# Patient Record
Sex: FEMALE | Race: WHITE | HISPANIC OR LATINO | Employment: UNEMPLOYED | URBAN - METROPOLITAN AREA
[De-identification: names, ages, dates, MRNs, and addresses within clinical notes are randomized per-mention and may not be internally consistent; named-entity substitution may affect disease eponyms.]

---

## 2017-01-31 ENCOUNTER — ALLSCRIPTS OFFICE VISIT (OUTPATIENT)
Dept: OTHER | Facility: OTHER | Age: 32
End: 2017-01-31

## 2017-01-31 DIAGNOSIS — J20.9 ACUTE BRONCHITIS: ICD-10-CM

## 2017-01-31 DIAGNOSIS — E55.9 VITAMIN D DEFICIENCY: ICD-10-CM

## 2017-01-31 DIAGNOSIS — Z72.51 HIGH RISK HETEROSEXUAL BEHAVIOR: ICD-10-CM

## 2017-01-31 DIAGNOSIS — F32.9 MAJOR DEPRESSIVE DISORDER, SINGLE EPISODE: ICD-10-CM

## 2017-01-31 DIAGNOSIS — M54.2 CERVICALGIA: ICD-10-CM

## 2017-01-31 DIAGNOSIS — R53.83 OTHER FATIGUE: ICD-10-CM

## 2017-02-03 ENCOUNTER — ALLSCRIPTS OFFICE VISIT (OUTPATIENT)
Dept: OTHER | Facility: OTHER | Age: 32
End: 2017-02-03

## 2017-02-03 ENCOUNTER — APPOINTMENT (OUTPATIENT)
Dept: LAB | Facility: CLINIC | Age: 32
End: 2017-02-03

## 2017-02-03 ENCOUNTER — TRANSCRIBE ORDERS (OUTPATIENT)
Dept: LAB | Facility: CLINIC | Age: 32
End: 2017-02-03

## 2017-02-03 DIAGNOSIS — R53.83 OTHER FATIGUE: ICD-10-CM

## 2017-02-03 DIAGNOSIS — E55.9 VITAMIN D DEFICIENCY: ICD-10-CM

## 2017-02-03 DIAGNOSIS — Z72.51 HIGH RISK HETEROSEXUAL BEHAVIOR: ICD-10-CM

## 2017-02-03 DIAGNOSIS — F32.9 MAJOR DEPRESSIVE DISORDER, SINGLE EPISODE: ICD-10-CM

## 2017-02-03 LAB
25(OH)D3 SERPL-MCNC: 18.3 NG/ML (ref 30–100)
ALBUMIN SERPL BCP-MCNC: 3.5 G/DL (ref 3.5–5)
ALP SERPL-CCNC: 79 U/L (ref 46–116)
ALT SERPL W P-5'-P-CCNC: 17 U/L (ref 12–78)
ANION GAP SERPL CALCULATED.3IONS-SCNC: 9 MMOL/L (ref 4–13)
AST SERPL W P-5'-P-CCNC: 14 U/L (ref 5–45)
BASOPHILS # BLD AUTO: 0.03 THOUSANDS/ΜL (ref 0–0.1)
BASOPHILS NFR BLD AUTO: 0 % (ref 0–1)
BILIRUB SERPL-MCNC: 0.31 MG/DL (ref 0.2–1)
BUN SERPL-MCNC: 8 MG/DL (ref 5–25)
CALCIUM SERPL-MCNC: 8.7 MG/DL (ref 8.3–10.1)
CHLORIDE SERPL-SCNC: 106 MMOL/L (ref 100–108)
CHOLEST SERPL-MCNC: 115 MG/DL (ref 50–200)
CO2 SERPL-SCNC: 27 MMOL/L (ref 21–32)
CREAT SERPL-MCNC: 0.74 MG/DL (ref 0.6–1.3)
EOSINOPHIL # BLD AUTO: 0.27 THOUSAND/ΜL (ref 0–0.61)
EOSINOPHIL NFR BLD AUTO: 3 % (ref 0–6)
ERYTHROCYTE [DISTWIDTH] IN BLOOD BY AUTOMATED COUNT: 12.7 % (ref 11.6–15.1)
GFR SERPL CREATININE-BSD FRML MDRD: >60 ML/MIN/1.73SQ M
GLUCOSE SERPL-MCNC: 73 MG/DL (ref 65–140)
HCT VFR BLD AUTO: 39.1 % (ref 34.8–46.1)
HDLC SERPL-MCNC: 37 MG/DL (ref 40–60)
HGB BLD-MCNC: 13.2 G/DL (ref 11.5–15.4)
LDLC SERPL CALC-MCNC: 67 MG/DL (ref 0–100)
LYMPHOCYTES # BLD AUTO: 1.48 THOUSANDS/ΜL (ref 0.6–4.47)
LYMPHOCYTES NFR BLD AUTO: 17 % (ref 14–44)
MCH RBC QN AUTO: 31 PG (ref 26.8–34.3)
MCHC RBC AUTO-ENTMCNC: 33.8 G/DL (ref 31.4–37.4)
MCV RBC AUTO: 92 FL (ref 82–98)
MONOCYTES # BLD AUTO: 0.75 THOUSAND/ΜL (ref 0.17–1.22)
MONOCYTES NFR BLD AUTO: 9 % (ref 4–12)
NEUTROPHILS # BLD AUTO: 6.03 THOUSANDS/ΜL (ref 1.85–7.62)
NEUTS SEG NFR BLD AUTO: 71 % (ref 43–75)
NRBC BLD AUTO-RTO: 0 /100 WBCS
PLATELET # BLD AUTO: 164 THOUSANDS/UL (ref 149–390)
PMV BLD AUTO: 13.7 FL (ref 8.9–12.7)
POTASSIUM SERPL-SCNC: 3.6 MMOL/L (ref 3.5–5.3)
PROT SERPL-MCNC: 7.2 G/DL (ref 6.4–8.2)
RBC # BLD AUTO: 4.26 MILLION/UL (ref 3.81–5.12)
SODIUM SERPL-SCNC: 142 MMOL/L (ref 136–145)
TRIGL SERPL-MCNC: 57 MG/DL
TSH SERPL DL<=0.05 MIU/L-ACNC: 1.02 UIU/ML (ref 0.36–3.74)
WBC # BLD AUTO: 8.59 THOUSAND/UL (ref 4.31–10.16)

## 2017-02-03 PROCEDURE — 87340 HEPATITIS B SURFACE AG IA: CPT

## 2017-02-03 PROCEDURE — 80061 LIPID PANEL: CPT

## 2017-02-03 PROCEDURE — 80053 COMPREHEN METABOLIC PANEL: CPT

## 2017-02-03 PROCEDURE — 84443 ASSAY THYROID STIM HORMONE: CPT

## 2017-02-03 PROCEDURE — 87389 HIV-1 AG W/HIV-1&-2 AB AG IA: CPT

## 2017-02-03 PROCEDURE — 86592 SYPHILIS TEST NON-TREP QUAL: CPT

## 2017-02-03 PROCEDURE — 36415 COLL VENOUS BLD VENIPUNCTURE: CPT

## 2017-02-03 PROCEDURE — 82306 VITAMIN D 25 HYDROXY: CPT

## 2017-02-03 PROCEDURE — 85025 COMPLETE CBC W/AUTO DIFF WBC: CPT

## 2017-02-03 PROCEDURE — 86803 HEPATITIS C AB TEST: CPT

## 2017-02-05 LAB
HBV SURFACE AG SER QL: NORMAL
HCV AB SER QL: NORMAL

## 2017-02-06 ENCOUNTER — GENERIC CONVERSION - ENCOUNTER (OUTPATIENT)
Dept: OTHER | Facility: OTHER | Age: 32
End: 2017-02-06

## 2017-02-06 LAB
HIV 1+2 AB+HIV1 P24 AG SERPL QL IA: NORMAL
RPR SER QL: NORMAL

## 2018-01-11 NOTE — MISCELLANEOUS
Reason For Visit  Reason For Visit Free Text Note Form: Contraception insertion  Patient desires Mirena; application made to and approved by the [employer]  Mirena received at facility 7/26/16 and patient scheduled for insertion 8/1/16  Active Problems    1  Acne (706 1) (L70 9)   2  Acute pharyngitis (462) (J02 9)   3  Acute urinary tract infection (599 0) (N39 0)   4  Cervicalgia (723 1) (M54 2)   5  Contact dermatitis (692 9) (L25 9)   6  Depression (311) (F32 9)   7  Dysuria (788 1) (R30 0)   8  Edema (782 3) (R60 9)   9  Encounter for contraceptive management (V25 9) (Z30 9)   10  Encounter for other general counseling or advice on contraception (V25 09) (Z30 09)   11  Intrauterine contraceptive device, checking (V25 42) (Z30 431)   12  Neck pain (723 1) (M54 2)   13  Routine Gynecological Exam With Cervical Pap Smear (V72 31)   14  Vitamin D deficiency (268 9) (E55 9)    Current Meds   1  No Reported Medications Recorded    Allergies    1   No Known Drug Allergies    Signatures   Electronically signed by : KALLI Roman; Jul 27 2016 10:49AM EST                       (Author)

## 2018-01-13 VITALS
HEART RATE: 80 BPM | SYSTOLIC BLOOD PRESSURE: 120 MMHG | TEMPERATURE: 98.8 F | WEIGHT: 146 LBS | OXYGEN SATURATION: 98 % | DIASTOLIC BLOOD PRESSURE: 80 MMHG | RESPIRATION RATE: 18 BRPM | BODY MASS INDEX: 24.32 KG/M2 | HEIGHT: 65 IN

## 2018-01-13 NOTE — MISCELLANEOUS
Provider Comments  Provider Comments:   Patient is a no-show for her 1140 acute appointment today        Signatures   Electronically signed by : Asa Gowers, PAC; Jan 31 2017 11:42AM EST                       (Author)

## 2018-01-14 VITALS
BODY MASS INDEX: 24.32 KG/M2 | SYSTOLIC BLOOD PRESSURE: 110 MMHG | DIASTOLIC BLOOD PRESSURE: 70 MMHG | WEIGHT: 146 LBS | RESPIRATION RATE: 18 BRPM | HEART RATE: 78 BPM | HEIGHT: 65 IN | TEMPERATURE: 97.5 F | OXYGEN SATURATION: 100 %

## 2018-01-15 NOTE — RESULT NOTES
Verified Results  (1) CBC/PLT/DIFF [id number] 11:13AM Patience Sauce Order Number: [id number]     Test Name Result Flag Reference   WBC COUNT 8 59 Thousand/uL  4 31-10 16   RBC COUNT 4 26 Million/uL  3 81-5 12   HEMOGLOBIN 13 2 g/dL  11 5-15 4   HEMATOCRIT 39 1 %  34 8-46  1   MCV 92 fL  82-98   MCH 31 0 pg  26 8-34 3   MCHC 33 8 g/dL  31 4-37 4   RDW 12 7 %  11 6-15 1   MPV 13 7 fL H 8 9-12 7   PLATELET COUNT 783 Thousands/uL  149-390   nRBC AUTOMATED 0 /100 WBCs     NEUTROPHILS RELATIVE PERCENT 71 %  43-75   LYMPHOCYTES RELATIVE PERCENT 17 %  14-44   MONOCYTES RELATIVE PERCENT 9 %  4-12   EOSINOPHILS RELATIVE PERCENT 3 %  0-6   BASOPHILS RELATIVE PERCENT 0 %  0-1   NEUTROPHILS ABSOLUTE COUNT 6 03 Thousands/?L  1 85-7 62   LYMPHOCYTES ABSOLUTE COUNT 1 48 Thousands/?L  0 60-4 47   MONOCYTES ABSOLUTE COUNT 0 75 Thousand/?L  0 17-1 22   EOSINOPHILS ABSOLUTE COUNT 0 27 Thousand/?L  0 00-0 61   BASOPHILS ABSOLUTE COUNT 0 03 Thousands/?L  0 00-0 10   - Patient Instructions: This bloodwork is non-fasting  Please drink two glasses of water morning of bloodwork  - Patient Instructions: This bloodwork is non-fasting  Please drink two glasses of water morning of bloodwork  (1) COMPREHENSIVE METABOLIC PANEL [id number] 57:22CC Patience Sauce Order Number: [id number]     Test Name Result Flag Reference   GLUCOSE,RANDM 73 mg/dL     If the patient is fasting, the ADA then defines impaired fasting glucose as > 100 mg/dL and diabetes as > or equal to 123 mg/dL     SODIUM 142 mmol/L  136-145   POTASSIUM 3 6 mmol/L  3 5-5 3   CHLORIDE 106 mmol/L  100-108   CARBON DIOXIDE 27 mmol/L  21-32   ANION GAP (CALC) 9 mmol/L  4-13   BLOOD UREA NITROGEN 8 mg/dL  5-25   CREATININE 0 74 mg/dL  0 60-1 30   Standardized to IDMS reference method   CALCIUM 8 7 mg/dL  8 3-10 1   BILI, TOTAL 0 31 mg/dL  0 20-1 00   ALK PHOSPHATAS 79 U/L     ALT (SGPT) 17 U/L  12-78   AST(SGOT) 14 U/L  5-45   ALBUMIN 3 5 g/dL 3 5-5 0   TOTAL PROTEIN 7 2 g/dL  6 4-8 2   eGFR Non-African American      >60 0 ml/min/1 73sq m   - Patient Instructions: This is a fasting blood test  Water,black tea or black  coffee only after 9:00pm the night before test Drink 2 glasses of water the morning of test   National Kidney Disease Education Program recommendations are as follows:  GFR calculation is accurate only with a steady state creatinine  Chronic Kidney disease less than 60 ml/min/1 73 sq  meters  Kidney failure less than 15 ml/min/1 73 sq  meters  (1) HEP C ANTIBODY 03Feb2017 11:13AM Shade Cyphers Order Number: [id number]     Test Name Result Flag Reference   HEPATITIS C ANTIBODY Non-reactive  Non-reactive     (1) HEP B SURFACE ANTIGEN 03Feb2017 11:13AM Shade Cyphers Order Number: [id number]     Test Name Result Flag Reference   HEPATITIS B SURFACE ANTIGEN Non-reactive  Non-reactive, NonReactive - Confirmed     (1) LIPID PANEL, FASTING 03Feb2017 11:13AM Shade Cyphers Order Number: [id number]     Test Name Result Flag Reference   CHOLESTEROL 115 mg/dL     HDL,DIRECT 37 mg/dL L 40-60   Specimen collection should occur prior to Metamizole administration due to the potential for falsely depressed results  LDL CHOLESTEROL CALCULATED 67 mg/dL  0-100   - Patient Instructions: This is a fasting blood test  Water,black tea or black  coffee only after 9:00pm the night before test   Drink 2 glasses of water the morning of test     - Patient Instructions:  This is a fasting blood test  Water,black tea or black  coffee only after 9:00pm the night before test Drink 2 glasses of water the morning of test   Triglyceride:         Normal              <150 mg/dl       Borderline High    150-199 mg/dl       High               200-499 mg/dl       Very High          >499 mg/dl  Cholesterol:         Desirable        <200 mg/dl      Borderline High  200-239 mg/dl      High             >239 mg/dl  HDL Cholesterol:        High    >59 mg/dL      Low     <41 mg/dL  LDL CALCULATED:    This screening LDL is a calculated result  It does not have the accuracy of the Direct Measured LDL in the monitoring of patients with hyperlipidemia and/or statin therapy  Direct Measure LDL (EPB586) must be ordered separately in these patients  TRIGLYCERIDES 57 mg/dL  <=150   Specimen collection should occur prior to N-Acetylcysteine or Metamizole administration due to the potential for falsely depressed results  (1) RPR [id number] 11:13AM Martir Amato Order Number: [id number]     Test Name Result Flag Reference   RPR Non-Reactive  Non-Reactive     (1) HIV AG/AB [place] GEN [id number] 11:13AM Martir Amato Order Number: [id number]     Test Name Result Flag Reference   HIV 1/2 AND P24 Non-Reactive  Non-Reactive   This test detects HIV 1, HIV2 and p24 Antigen  (1) TSH WITH FT4 REFLEX [id number] 11:13AM Matrir Amato Order Number: [id number]     Test Name Result Flag Reference   TSH 1 020 uIU/mL  0 358-3 740   - Patient Instructions: This is a fasting blood test  Water,black tea or black  coffee only after 9:00pm the night before test Drink 2 glasses of water the morning of test   Patients undergoing fluorescein dye angiography may retain small amounts of fluorescein in the body for 48-72 hours post procedure  Samples containing fluorescein can produce falsely depressed TSH values  If the patient had this procedure,a specimen should be resubmitted post fluorescein clearance            The recommended reference ranges for TSH during pregnancy are as follows:  First trimester 0 1 to 2 5 uIU/mL  Second trimester  0 2 to 3 0 uIU/mL  Third trimester 0 3 to 3 0 uIU/m     (1) VITAMIN D 25-HYDROXY [id number] 11:13AM Wendy Lynch Order Number: [id number]     Test Name Result Flag Reference   VIT D 25-HYDROX 18 3 ng/mL L 30 0-100 0   This assay is a certified procedure of the CDC Vitamin D Standardization Certification Program (VDSCP)     Deficiency <20ng/ml   Insufficiency 20-30ng/ml   Sufficient  ng/ml     *Patients undergoing fluorescein dye angiography may retain small amounts of fluorescein in the body for 48-72 hours post procedure  Samples containing fluorescein can produce falsely elevated Vitamin D values  If the patient had this procedure, a specimen should be resubmitted post fluorescein clearance         Plan  Vitamin D deficiency    · Vitamin D3 5000 UNIT Oral Capsule; TAKE 1 CAPSULE BY MOUTH EVERY DAY

## 2018-01-18 NOTE — MISCELLANEOUS
Provider Comments  Provider Comments:   Patient is a no-show for her 8:00 appointment today        Signatures   Electronically signed by : Jarrod Castano PAC; Jun 27 2016  8:14AM EST                       (Author)    Electronically signed by : TARSHA Ugalde ; Jul 11 2016 11:08AM EST

## 2019-06-01 ENCOUNTER — HOSPITAL ENCOUNTER (EMERGENCY)
Facility: HOSPITAL | Age: 34
Discharge: HOME | End: 2019-06-01
Attending: EMERGENCY MEDICINE

## 2019-06-01 VITALS
TEMPERATURE: 97.9 F | DIASTOLIC BLOOD PRESSURE: 78 MMHG | HEIGHT: 65 IN | BODY MASS INDEX: 26.96 KG/M2 | SYSTOLIC BLOOD PRESSURE: 126 MMHG | WEIGHT: 161.82 LBS | HEART RATE: 63 BPM | OXYGEN SATURATION: 100 % | RESPIRATION RATE: 16 BRPM

## 2019-06-01 DIAGNOSIS — K02.9 DENTAL CARIES: ICD-10-CM

## 2019-06-01 DIAGNOSIS — K04.7 DENTAL INFECTION: Primary | ICD-10-CM

## 2019-06-01 PROCEDURE — 99284 EMERGENCY DEPT VISIT MOD MDM: CPT | Performed by: EMERGENCY MEDICINE

## 2019-06-01 PROCEDURE — 99283 EMERGENCY DEPT VISIT LOW MDM: CPT

## 2019-06-01 RX ORDER — NAPROXEN 500 MG/1
500 TABLET ORAL 2 TIMES DAILY PRN
Qty: 20 TABLET | Refills: 0 | Status: SHIPPED | OUTPATIENT
Start: 2019-06-01 | End: 2019-09-06

## 2019-06-01 RX ORDER — NAPROXEN 250 MG/1
500 TABLET ORAL ONCE
Status: COMPLETED | OUTPATIENT
Start: 2019-06-01 | End: 2019-06-01

## 2019-06-01 RX ORDER — PENICILLIN V POTASSIUM 500 MG/1
500 TABLET ORAL EVERY 8 HOURS SCHEDULED
Qty: 30 TABLET | Refills: 0 | Status: SHIPPED | OUTPATIENT
Start: 2019-06-01 | End: 2019-06-11

## 2019-06-01 RX ORDER — HYDROCODONE BITARTRATE AND ACETAMINOPHEN 5; 325 MG/1; MG/1
1 TABLET ORAL ONCE
Status: COMPLETED | OUTPATIENT
Start: 2019-06-01 | End: 2019-06-01

## 2019-06-01 RX ORDER — PENICILLIN V POTASSIUM 250 MG/1
500 TABLET ORAL ONCE
Status: COMPLETED | OUTPATIENT
Start: 2019-06-01 | End: 2019-06-01

## 2019-06-01 RX ADMIN — PENICILLIN V POTASIUM 500 MG: 250 TABLET ORAL at 23:49

## 2019-06-01 RX ADMIN — HYDROCODONE BITARTRATE AND ACETAMINOPHEN 1 TABLET: 5; 325 TABLET ORAL at 23:49

## 2019-06-01 RX ADMIN — NAPROXEN 500 MG: 250 TABLET ORAL at 23:48

## 2019-07-09 ENCOUNTER — ANNUAL EXAM (OUTPATIENT)
Dept: OBGYN CLINIC | Facility: CLINIC | Age: 34
End: 2019-07-09

## 2019-07-09 VITALS
HEIGHT: 65 IN | BODY MASS INDEX: 26.33 KG/M2 | WEIGHT: 158 LBS | DIASTOLIC BLOOD PRESSURE: 76 MMHG | SYSTOLIC BLOOD PRESSURE: 122 MMHG

## 2019-07-09 DIAGNOSIS — Z01.419 WOMEN'S ANNUAL ROUTINE GYNECOLOGICAL EXAMINATION: Primary | ICD-10-CM

## 2019-07-09 PROBLEM — R87.810 CERVICAL HIGH RISK HPV (HUMAN PAPILLOMAVIRUS) TEST POSITIVE: Status: ACTIVE | Noted: 2017-07-06

## 2019-07-09 PROCEDURE — G0145 SCR C/V CYTO,THINLAYER,RESCR: HCPCS | Performed by: NURSE PRACTITIONER

## 2019-07-09 PROCEDURE — 87624 HPV HI-RISK TYP POOLED RSLT: CPT | Performed by: NURSE PRACTITIONER

## 2019-07-09 PROCEDURE — 99385 PREV VISIT NEW AGE 18-39: CPT | Performed by: NURSE PRACTITIONER

## 2019-07-09 NOTE — PROGRESS NOTES
Subjective  HPI:     Jennifer Zacarias is a 36 y o  female  She is a [place] 2 Para 2, both vaginal births, she did have pre-eclampsia with her daughters birth  Her menstrual cycles are regular occurring every month that lasts for 2 days and are very light  History of using [address] IUD but has been removed last year due to cramping and pain  She is not currently using any method of contraception  She denies issues with intimacy  She denies /GI and Gyn complaints  She denies depression/anxiety  Medical, surgical and family history reviewed  Her dental care is up-to-date  She eats a healthy diet and exercises regularly  She is happy with her weight  Gynecologic History    Patient's last menstrual period was 2019  Last Pap: 2018  Results were: Negative intraepithelial cells with positive other HPV    Obstetric History    OB History    Para Term  AB Living   2 2 2     2   SAB TAB Ectopic Multiple Live Births                  # Outcome Date GA Lbr Supa/2nd Weight Sex Delivery Anes PTL Lv   2 Term            1 Term                The following portions of the patient's history were reviewed and updated as appropriate: allergies, current medications, past family history, past medical history, past social history, past surgical history and problem list     Review of Systems    Pertinent items are noted in HPI  Objective    Physical Exam   Constitutional: Vital signs are normal  She appears well-developed and well-nourished  Genitourinary: Uterus normal  Pelvic exam was performed with patient supine  There is no rash, tenderness, lesion or Bartholin's cyst on the right labia  There is no rash, tenderness, lesion or Bartholin's cyst on the left labia  Right adnexum does not display mass, does not display tenderness and does not display fullness  Left adnexum does not display mass, does not display tenderness and does not display fullness  Cervix is not parous  Cervix exhibits friability  Cervix does not exhibit motion tenderness, lesion, discharge, polyp or nabothian cyst      Uterus is anteverted  Genitourinary Comments: Normal physiological discharge noted  HENT:   Head: Normocephalic and atraumatic  Neck: Neck supple  No thyromegaly present  Cardiovascular: Normal rate, regular rhythm, S1 normal, S2 normal and normal heart sounds  Pulmonary/Chest: Effort normal and breath sounds normal  Right breast exhibits no inverted nipple, no mass, no nipple discharge, no skin change and no tenderness  Left breast exhibits no inverted nipple, no mass, no nipple discharge, no skin change and no tenderness  Abdominal: Soft  Bowel sounds are normal  She exhibits no distension and no mass  There is no tenderness  There is no guarding  Lymphadenopathy:     She has no cervical adenopathy  She has no axillary adenopathy  Right: No inguinal adenopathy present  Left: No inguinal adenopathy present  Neurological: She is alert  Skin: Skin is warm  Psychiatric: She has a normal mood and affect  Nursing note and vitals reviewed  Assessment and Plan    Maynor Mendez was seen today for gynecologic exam     Diagnoses and all orders for this visit:    Women's annual routine gynecological examination  -     Liquid-based pap, screening      Patient informed of a Stable GYN exam  A pap smear was performed  The current ASCCP guidelines were reviewed  The low risk patient will receive pap smear screening every 3 years until the age of 34 and then every 3 to 5 years with HPV co-testing from the ages of 33-67  I emphasized the importance of an annual pelvic and breast exam      A yearly mammogram is recommended for breast cancer screening starting at age 36  All questions have been answered to her satisfaction  Follow up in: 1 year

## 2019-07-10 LAB
HPV HR 12 DNA CVX QL NAA+PROBE: NEGATIVE
HPV16 DNA CVX QL NAA+PROBE: NEGATIVE
HPV18 DNA CVX QL NAA+PROBE: NEGATIVE

## 2019-07-12 LAB
LAB AP GYN PRIMARY INTERPRETATION: NORMAL
LAB AP LMP: NORMAL
Lab: NORMAL

## 2019-07-29 ENCOUNTER — OFFICE VISIT (OUTPATIENT)
Dept: FAMILY MEDICINE CLINIC | Facility: CLINIC | Age: 34
End: 2019-07-29

## 2019-07-29 ENCOUNTER — TELEPHONE (OUTPATIENT)
Dept: FAMILY MEDICINE CLINIC | Facility: CLINIC | Age: 34
End: 2019-07-29

## 2019-07-29 VITALS
TEMPERATURE: 97.7 F | BODY MASS INDEX: 26.89 KG/M2 | WEIGHT: 161.6 LBS | SYSTOLIC BLOOD PRESSURE: 118 MMHG | OXYGEN SATURATION: 98 % | RESPIRATION RATE: 16 BRPM | HEART RATE: 78 BPM | DIASTOLIC BLOOD PRESSURE: 76 MMHG

## 2019-07-29 DIAGNOSIS — J01.00 ACUTE NON-RECURRENT MAXILLARY SINUSITIS: Primary | ICD-10-CM

## 2019-07-29 PROCEDURE — 99213 OFFICE O/P EST LOW 20 MIN: CPT | Performed by: FAMILY MEDICINE

## 2019-07-29 RX ORDER — AMOXICILLIN 500 MG/1
500 CAPSULE ORAL EVERY 12 HOURS SCHEDULED
Qty: 20 CAPSULE | Refills: 0 | Status: SHIPPED | OUTPATIENT
Start: 2019-07-29 | End: 2019-08-08

## 2019-07-29 NOTE — PROGRESS NOTES
Assessment/Plan:    Acute non-recurrent maxillary sinusitis  Patient has been having symptoms starting from Friday which include sore throat headache sinus pressure which is progressively getting worse  Patient states that she also spiked a subjective fever  Patient has no known drug allergies  -will start amoxicillin 10 days bid       Diagnoses and all orders for this visit:    Acute non-recurrent maxillary sinusitis  -     amoxicillin (AMOXIL) 500 mg capsule; Take 1 capsule (500 mg total) by mouth every 12 (twelve) hours for 10 days          Subjective:      Patient ID: Judge Warner is a 36 y o  female  This is a very pleasant 30-year-old female who presents to the clinic for sick visit  Patient has been having fever sore throat sinus pressure starting from this past Friday  Patient states that the symptoms is getting progressively worse  Patient failed over-the-counter and supportive treatment  She states that she has a subjective fever  The following portions of the patient's history were reviewed and updated as appropriate: allergies, current medications, past family history, past medical history, past social history, past surgical history and problem list     Review of Systems   Constitutional: Negative for chills and fever  HENT: Positive for sinus pressure, sinus pain and sore throat  Negative for congestion  Eyes: Negative for visual disturbance  Respiratory: Negative for wheezing  Cardiovascular: Negative for chest pain  Gastrointestinal: Negative for abdominal pain, blood in stool and nausea  Endocrine: Negative for cold intolerance and heat intolerance  Genitourinary: Negative for dysuria and hematuria  Musculoskeletal: Negative for gait problem  Skin: Negative for rash  Allergic/Immunologic: Negative for environmental allergies  Neurological: Negative for syncope  Hematological: Does not bruise/bleed easily     Psychiatric/Behavioral: Negative for behavioral problems  Objective:      /76 (BP Location: Right arm, Patient Position: Sitting, Cuff Size: Standard)   Pulse 78   Temp 97 7 °F (36 5 °C) (Temporal)   Resp 16   Wt 73 3 kg (161 lb 9 6 oz)   SpO2 98%   BMI 26 89 kg/m²          Physical Exam   Constitutional: She is oriented to person, place, and time  She appears well-developed and well-nourished  No distress  HENT:   Head: Normocephalic and atraumatic  Right Ear: External ear normal    Left Ear: External ear normal    Nose: Nose normal    Mouth/Throat: Oropharyngeal exudate present  Oropharyngeal area has erythema and exudates  Tender sinuses on the maxillary area  Eyes: Pupils are equal, round, and reactive to light  Conjunctivae and EOM are normal  Right eye exhibits no discharge  Left eye exhibits no discharge  Neck: Normal range of motion  Neck supple  No JVD present  No tracheal deviation present  No thyromegaly present  Cardiovascular: Normal rate, regular rhythm, normal heart sounds and intact distal pulses  Exam reveals no gallop and no friction rub  No murmur heard  Pulmonary/Chest: Effort normal and breath sounds normal  No respiratory distress  She has no wheezes  She exhibits no tenderness  Abdominal: Soft  Bowel sounds are normal  She exhibits no distension  There is no tenderness  There is no rebound  Musculoskeletal: Normal range of motion  She exhibits no edema or tenderness  Neurological: She is alert and oriented to person, place, and time  She has normal reflexes  Coordination normal    Skin: Skin is warm and dry  No rash noted  She is not diaphoretic  No erythema  Psychiatric: She has a normal mood and affect  Her behavior is normal  Thought content normal    Vitals reviewed

## 2019-07-29 NOTE — TELEPHONE ENCOUNTER
Unable to leave message to inform pt of office temperature   If pt returns phone call please ask if she would like to keep appt or reschedule

## 2019-07-29 NOTE — ASSESSMENT & PLAN NOTE
Patient has been having symptoms starting from Friday which include sore throat headache sinus pressure which is progressively getting worse  Patient states that she also spiked a subjective fever  Patient has no known drug allergies    -will start amoxicillin 10 days bid

## 2019-09-06 ENCOUNTER — OFFICE VISIT (OUTPATIENT)
Dept: FAMILY MEDICINE CLINIC | Facility: CLINIC | Age: 34
End: 2019-09-06

## 2019-09-06 VITALS
OXYGEN SATURATION: 97 % | DIASTOLIC BLOOD PRESSURE: 70 MMHG | TEMPERATURE: 97.7 F | HEART RATE: 72 BPM | BODY MASS INDEX: 27.16 KG/M2 | WEIGHT: 163 LBS | HEIGHT: 65 IN | RESPIRATION RATE: 18 BRPM | SYSTOLIC BLOOD PRESSURE: 122 MMHG

## 2019-09-06 DIAGNOSIS — N64.4 BREAST PAIN IN FEMALE: Primary | ICD-10-CM

## 2019-09-06 LAB — SL AMB POCT URINE HCG: NEGATIVE

## 2019-09-06 PROCEDURE — 99213 OFFICE O/P EST LOW 20 MIN: CPT | Performed by: FAMILY MEDICINE

## 2019-09-06 PROCEDURE — 81025 URINE PREGNANCY TEST: CPT | Performed by: FAMILY MEDICINE

## 2019-09-06 RX ORDER — IBUPROFEN 600 MG/1
600 TABLET ORAL EVERY 8 HOURS PRN
Qty: 30 TABLET | Refills: 0 | Status: SHIPPED | OUTPATIENT
Start: 2019-09-06 | End: 2020-12-22

## 2019-09-06 NOTE — PROGRESS NOTES
Assessment/Plan:    Breast pain in female  Patient has been experiencing painful breast right worse than left for the past 2 months  Initially started during her menstrual cycle however persisted  Pain is consistent every single day feels like a burning and swelling  Patient has 2 children, currently not breast-feeding  Currently sexually active no contraception  Denies any discharge  Patient has no family or personal history of breast cancer  The pain is worse around menstrual cycles however appears to be lingering throughout the most of the month  -will get in office beta HCG  -will get ultrasound bilateral breast  -will prescribe 600 mg Motrin for pain control       Diagnoses and all orders for this visit:    Breast pain in female  -     POCT urine HCG          Subjective:      Patient ID: Myles Duncan is a 36 y o  female  This is a very pleasant 46-year-old female who presents for a sick visit  Patient has been experiencing breast pain for the past 2 months  Denies any family or personal history of breast cancer  Breast pain is in the setting of menstrual cycles however rest of the month has been bothering her  Is not currently breastfeeding  All patient is sexually active and does not use any contraceptive methods  Denies any discharge from breast        The following portions of the patient's history were reviewed and updated as appropriate: allergies, current medications, past family history, past medical history, past social history, past surgical history and problem list     Review of Systems   Constitutional: Negative for chills and fever  HENT: Negative for congestion and sore throat  Eyes: Negative for visual disturbance  Respiratory: Negative for wheezing  Cardiovascular: Negative for chest pain  Gastrointestinal: Negative for abdominal pain, blood in stool and nausea  Endocrine: Negative for cold intolerance and heat intolerance     Genitourinary: Negative for dysuria and hematuria  Bilateral breast pain right worse than left   Musculoskeletal: Negative for gait problem  Skin: Negative for rash  Allergic/Immunologic: Negative for environmental allergies  Neurological: Negative for syncope  Hematological: Does not bruise/bleed easily  Psychiatric/Behavioral: Negative for behavioral problems  Objective:      /70 (BP Location: Right arm, Patient Position: Sitting, Cuff Size: Standard)   Pulse 72   Temp 97 7 °F (36 5 °C) (Temporal)   Resp 18   Ht 5' 5" (1 651 m)   Wt 73 9 kg (163 lb)   LMP 08/13/2019 (Exact Date)   SpO2 97%   BMI 27 12 kg/m²          Physical Exam   Constitutional: She is oriented to person, place, and time  She appears well-developed and well-nourished  No distress  HENT:   Head: Normocephalic and atraumatic  Right Ear: External ear normal    Left Ear: External ear normal    Nose: Nose normal    Mouth/Throat: Oropharynx is clear and moist    Eyes: Pupils are equal, round, and reactive to light  Conjunctivae and EOM are normal  Right eye exhibits no discharge  Left eye exhibits no discharge  Neck: Normal range of motion  Neck supple  No JVD present  No tracheal deviation present  No thyromegaly present  Cardiovascular: Normal rate, regular rhythm, normal heart sounds and intact distal pulses  Exam reveals no gallop and no friction rub  No murmur heard  Pulmonary/Chest: Effort normal and breath sounds normal  No respiratory distress  She has no wheezes  She exhibits no tenderness  Bilateral breasts were examined  Left breast noted tenderness most on 6:00 position  No masses noted at location  Right breast pain on palpation on the 11:00 position a 1 x 1 cm soft mobile mass noted on palpation  No discharge on either breast   No erythema noted  No swelling noted  Abdominal: Soft  Bowel sounds are normal  She exhibits no distension  There is no tenderness  There is no rebound     Musculoskeletal: Normal range of motion  She exhibits no edema or tenderness  Neurological: She is alert and oriented to person, place, and time  She has normal reflexes  Coordination normal    Skin: Skin is warm and dry  No rash noted  She is not diaphoretic  No erythema  Psychiatric: She has a normal mood and affect  Her behavior is normal  Thought content normal    Nursing note and vitals reviewed

## 2019-09-06 NOTE — ASSESSMENT & PLAN NOTE
Patient has been experiencing painful breast right worse than left for the past 2 months  Initially started during her menstrual cycle however persisted  Pain is consistent every single day feels like a burning and swelling  Patient has 2 children, currently not breast-feeding  Currently sexually active no contraception  Denies any discharge  Patient has no family or personal history of breast cancer  The pain is worse around menstrual cycles however appears to be lingering throughout the most of the month      -will get in office beta HCG  -will get ultrasound bilateral breast  -will prescribe 600 mg Motrin for pain control

## 2019-09-17 ENCOUNTER — HOSPITAL ENCOUNTER (OUTPATIENT)
Dept: MAMMOGRAPHY | Facility: CLINIC | Age: 34
Discharge: HOME | End: 2019-09-17

## 2019-09-17 ENCOUNTER — HOSPITAL ENCOUNTER (OUTPATIENT)
Dept: ULTRASOUND IMAGING | Facility: CLINIC | Age: 34
Discharge: HOME | End: 2019-09-17

## 2019-09-17 VITALS — BODY MASS INDEX: 27.16 KG/M2 | WEIGHT: 163 LBS | HEIGHT: 65 IN

## 2019-09-17 VITALS — HEIGHT: 65 IN | BODY MASS INDEX: 27.16 KG/M2 | WEIGHT: 163 LBS

## 2019-09-17 DIAGNOSIS — N64.4 BREAST PAIN IN FEMALE: ICD-10-CM

## 2019-09-17 PROCEDURE — 77066 DX MAMMO INCL CAD BI: CPT

## 2019-09-17 PROCEDURE — 76642 ULTRASOUND BREAST LIMITED: CPT

## 2019-09-17 PROCEDURE — G0279 TOMOSYNTHESIS, MAMMO: HCPCS

## 2020-02-18 ENCOUNTER — TELEPHONE (OUTPATIENT)
Dept: SURGICAL ONCOLOGY | Facility: CLINIC | Age: 35
End: 2020-02-18

## 2020-02-18 NOTE — TELEPHONE ENCOUNTER
Pt called to make an appt with a gyn onc doctor  She stated she was not referred by anyone, just googled to find a doctor  She stated she was having pain and bleeding during sex  I told the pt she should speak with her regular gyn first and if need be contact us  Pt understood

## 2020-02-19 ENCOUNTER — OFFICE VISIT (OUTPATIENT)
Dept: OBGYN CLINIC | Facility: CLINIC | Age: 35
End: 2020-02-19
Payer: COMMERCIAL

## 2020-02-19 VITALS
HEIGHT: 65 IN | WEIGHT: 163 LBS | DIASTOLIC BLOOD PRESSURE: 76 MMHG | SYSTOLIC BLOOD PRESSURE: 124 MMHG | BODY MASS INDEX: 27.16 KG/M2

## 2020-02-19 DIAGNOSIS — N89.8 VAGINAL DISCHARGE: Primary | ICD-10-CM

## 2020-02-19 PROCEDURE — 99213 OFFICE O/P EST LOW 20 MIN: CPT | Performed by: NURSE PRACTITIONER

## 2020-02-19 PROCEDURE — 3008F BODY MASS INDEX DOCD: CPT | Performed by: NURSE PRACTITIONER

## 2020-02-19 PROCEDURE — 1036F TOBACCO NON-USER: CPT | Performed by: NURSE PRACTITIONER

## 2020-02-19 PROCEDURE — 3008F BODY MASS INDEX DOCD: CPT | Performed by: FAMILY MEDICINE

## 2020-02-19 NOTE — PROGRESS NOTES
SUBJECTIVE:     30 y o  female complains of dark discharge which started on Thursday of last week when she wipes  She has some bleeding last week after intercourse  Denies odor  Denies abnormal vaginal bleeding or significant pelvic pain or  fever  No UTI symptoms  Denies history of known exposure to STD  Patient's last menstrual period was 01/27/2020  OBJECTIVE:     Physical Exam   Constitutional: Vital signs are normal  She appears well-developed and well-nourished  Genitourinary: Vagina normal  Pelvic exam was performed with patient supine  There is no rash, tenderness, lesion or injury on the right labia  There is no rash, tenderness, lesion or injury on the left labia  Genitourinary Comments: There is scant old blood noted with a mix of new blood  This can represent her menses coming now since she is on day 24 of her cycle  HENT:   Head: Normocephalic and atraumatic  Neck: Neck supple  Neurological: She is alert  Skin: Skin is warm and dry  Nursing note and vitals reviewed  ASSESSMENT AND PLAN:     Duran Bhatt was seen today for vaginal discharge and postcoital bleeding  Diagnoses and all orders for this visit:    Vaginal discharge      Exam findings explained to patient which most likely represents the onset of her menses  She is to follow-up in July for her annual or sooner if needed  All questions and concerns addressed and patient verbalized understanding

## 2020-02-20 ENCOUNTER — OFFICE VISIT (OUTPATIENT)
Dept: FAMILY MEDICINE CLINIC | Facility: CLINIC | Age: 35
End: 2020-02-20

## 2020-02-20 VITALS
SYSTOLIC BLOOD PRESSURE: 122 MMHG | WEIGHT: 167 LBS | RESPIRATION RATE: 18 BRPM | OXYGEN SATURATION: 99 % | TEMPERATURE: 96.7 F | DIASTOLIC BLOOD PRESSURE: 80 MMHG | BODY MASS INDEX: 27.79 KG/M2 | HEART RATE: 78 BPM

## 2020-02-20 DIAGNOSIS — N64.4 BREAST PAIN IN FEMALE: ICD-10-CM

## 2020-02-20 DIAGNOSIS — R53.83 OTHER FATIGUE: ICD-10-CM

## 2020-02-20 DIAGNOSIS — R87.810 CERVICAL HIGH RISK HPV (HUMAN PAPILLOMAVIRUS) TEST POSITIVE: ICD-10-CM

## 2020-02-20 DIAGNOSIS — J01.01 ACUTE RECURRENT MAXILLARY SINUSITIS: Primary | ICD-10-CM

## 2020-02-20 PROCEDURE — 99213 OFFICE O/P EST LOW 20 MIN: CPT | Performed by: FAMILY MEDICINE

## 2020-02-20 PROCEDURE — 1036F TOBACCO NON-USER: CPT | Performed by: FAMILY MEDICINE

## 2020-02-20 RX ORDER — AMOXICILLIN AND CLAVULANATE POTASSIUM 875; 125 MG/1; MG/1
1 TABLET, FILM COATED ORAL EVERY 12 HOURS SCHEDULED
Qty: 20 TABLET | Refills: 0 | Status: SHIPPED | OUTPATIENT
Start: 2020-02-20 | End: 2020-02-27

## 2020-02-20 NOTE — Clinical Note
Dear Dr Bruna Raymundo, I saw this patient as sick visit  She is wondering about her mammogram results  I did discuss them with her however it appears she needed a repeat study in 6 months  Can you please call this patient and clarify this for her?  Thanks!!

## 2020-02-20 NOTE — PROGRESS NOTES
Assessment/Plan:    Acute recurrent maxillary sinusitis  Suspect viral in origin however she has been symptomatic for at least 7 days  -Given paper script for Augmentin to fill if symptoms do not resolve or if symptoms worsen  -Advised non-pharmacological measures: netipot with distilled water, warm tea with honey    -Due to patient's concerns with history of HPV+ on pap smear and recurrent sore throats, referred to ENT    Breast pain in female  Completed mammo in 9, 2019 followed with US B/L breasts in 9, 2019 - recommendation to repeat in 6 months      -Personally discussed with PCP to call patient in regards to follow-up  Diagnoses and all orders for this visit:    Acute recurrent maxillary sinusitis  -     Ambulatory Referral to Otolaryngology; Future  -     amoxicillin-clavulanate (Augmentin) 875-125 mg per tablet; Take 1 tablet by mouth every 12 (twelve) hours for 7 days    Other fatigue  -     TSH, 3rd generation with Free T4 reflex; Future    Cervical high risk HPV (human papillomavirus) test positive    Breast pain in female          Subjective:      Patient ID: Pratibha Dodd is a 30 y o  female  80-year-old female with no significant past medical history presents a sick visit  Facial pain  She is reporting 7 day history of facial pain, teeth pain associated with sore throat and runny nose  States she has previously been diagnosed with rhinosinusitis  Denies fevers or chills  Denies sick contacts  Denies seasonal allergies  Denies ear pain  States she is concerned given that she was diagnosed with HPV on cervical cancer screening recently  States she has been having sore throat with frequent infections, requesting to be evaluated by ENT  Review of Systems   Constitutional: Negative for chills and fever  HENT: Positive for rhinorrhea, sinus pain and sore throat  Respiratory: Negative for cough and shortness of breath      Cardiovascular: Negative for chest pain and palpitations  Endocrine: Negative for polyphagia and polyuria  Musculoskeletal: Negative for back pain  Neurological: Negative for light-headedness, numbness and headaches  Objective:      /80 (BP Location: Right arm, Patient Position: Sitting, Cuff Size: Adult)   Pulse 78   Temp (!) 96 7 °F (35 9 °C)   Resp 18   Wt 75 8 kg (167 lb)   LMP 02/14/2020 (Within Days)   SpO2 99%   BMI 27 79 kg/m²          Physical Exam   Constitutional: She appears well-developed and well-nourished  HENT:   Head: Normocephalic and atraumatic  Right Ear: Tympanic membrane normal    Left Ear: Tympanic membrane normal    Nose: Mucosal edema and rhinorrhea present  Mouth/Throat: Oropharynx is clear and moist  No oropharyngeal exudate  TTP to maxillary sinuses    Eyes: EOM are normal    Neck: Normal range of motion  Neck supple  Cardiovascular: Normal rate and normal heart sounds  Pulmonary/Chest: Effort normal and breath sounds normal  No respiratory distress  Abdominal: Soft  Bowel sounds are normal  She exhibits no distension  Neurological: She is alert  Skin: Skin is warm and dry  Psychiatric: She has a normal mood and affect

## 2020-02-20 NOTE — ASSESSMENT & PLAN NOTE
Completed mammo in 9, 2019 followed with US B/L breasts in 9, 2019 - recommendation to repeat in 6 months      -Personally discussed with PCP to call patient in regards to follow-up

## 2020-02-20 NOTE — ASSESSMENT & PLAN NOTE
Suspect viral in origin however she has been symptomatic for at least 7 days       -Given paper script for Augmentin to fill if symptoms do not resolve or if symptoms worsen  -Advised non-pharmacological measures: netipot with distilled water, warm tea with honey    -Due to patient's concerns with history of HPV+ on pap smear and recurrent sore throats, referred to ENT

## 2020-02-21 ENCOUNTER — APPOINTMENT (OUTPATIENT)
Dept: LAB | Facility: HOSPITAL | Age: 35
End: 2020-02-21
Payer: COMMERCIAL

## 2020-02-21 DIAGNOSIS — R53.83 OTHER FATIGUE: ICD-10-CM

## 2020-02-21 LAB — TSH SERPL DL<=0.05 MIU/L-ACNC: 2.95 UIU/ML (ref 0.36–3.74)

## 2020-02-21 PROCEDURE — 36415 COLL VENOUS BLD VENIPUNCTURE: CPT

## 2020-02-21 PROCEDURE — 84443 ASSAY THYROID STIM HORMONE: CPT

## 2020-02-25 ENCOUNTER — TELEPHONE (OUTPATIENT)
Dept: FAMILY MEDICINE CLINIC | Facility: CLINIC | Age: 35
End: 2020-02-25

## 2020-02-25 NOTE — TELEPHONE ENCOUNTER
Patient called stating she needs an order for her mammogram and ultrasound to be put into Epic  She is to repeat it in 6 months  And she is due for it now  Mammogram and ultrasound  Thank you  She will schedule it as soon as order is in

## 2020-02-26 DIAGNOSIS — N64.4 BREAST PAIN IN FEMALE: Primary | ICD-10-CM

## 2020-02-26 DIAGNOSIS — R92.8 ABNORMAL ULTRASOUND OF BREAST: ICD-10-CM

## 2020-03-02 DIAGNOSIS — Z78.9 NEED FOR FOLLOW-UP BY SOCIAL WORKER: Primary | ICD-10-CM

## 2020-03-05 ENCOUNTER — PATIENT OUTREACH (OUTPATIENT)
Dept: FAMILY MEDICINE CLINIC | Facility: CLINIC | Age: 35
End: 2020-03-05

## 2020-03-06 ENCOUNTER — PATIENT OUTREACH (OUTPATIENT)
Dept: FAMILY MEDICINE CLINIC | Facility: CLINIC | Age: 35
End: 2020-03-06

## 2020-03-09 ENCOUNTER — PATIENT OUTREACH (OUTPATIENT)
Dept: FAMILY MEDICINE CLINIC | Facility: CLINIC | Age: 35
End: 2020-03-09

## 2020-03-09 NOTE — LETTER
March 9, 2020     Shiva Stanley [facility] [id number]    Patient: Shiva Serrano   YOB: 1985   Date of Visit: 3/9/2020       Dear Ms Small:    I was trying to contact you in regards to your two missed appts  Please call me and let me know if you have any social barriers to getting to our office            Sincerely,        Momo Funk, MSW        CC: No Recipients

## 2020-04-23 ENCOUNTER — TELEPHONE (OUTPATIENT)
Dept: OTOLARYNGOLOGY | Facility: CLINIC | Age: 35
End: 2020-04-23

## 2020-05-13 ENCOUNTER — HOSPITAL ENCOUNTER (OUTPATIENT)
Dept: MAMMOGRAPHY | Facility: CLINIC | Age: 35
Discharge: HOME | End: 2020-05-13
Payer: COMMERCIAL

## 2020-05-13 VITALS — WEIGHT: 167 LBS | HEIGHT: 65 IN | BODY MASS INDEX: 27.82 KG/M2

## 2020-05-13 DIAGNOSIS — N64.4 BREAST PAIN IN FEMALE: ICD-10-CM

## 2020-05-13 DIAGNOSIS — R92.8 ABNORMAL ULTRASOUND OF BREAST: ICD-10-CM

## 2020-05-13 PROCEDURE — G0279 TOMOSYNTHESIS, MAMMO: HCPCS

## 2020-05-13 PROCEDURE — 77065 DX MAMMO INCL CAD UNI: CPT

## 2020-12-22 ENCOUNTER — TRANSCRIBE ORDERS (OUTPATIENT)
Dept: RADIOLOGY | Facility: HOSPITAL | Age: 35
End: 2020-12-22

## 2020-12-22 ENCOUNTER — OFFICE VISIT (OUTPATIENT)
Dept: INTERNAL MEDICINE CLINIC | Facility: CLINIC | Age: 35
End: 2020-12-22

## 2020-12-22 ENCOUNTER — HOSPITAL ENCOUNTER (OUTPATIENT)
Dept: RADIOLOGY | Facility: HOSPITAL | Age: 35
Discharge: HOME | End: 2020-12-22
Payer: COMMERCIAL

## 2020-12-22 ENCOUNTER — LAB (OUTPATIENT)
Dept: LAB | Facility: HOSPITAL | Age: 35
End: 2020-12-22
Payer: COMMERCIAL

## 2020-12-22 VITALS
SYSTOLIC BLOOD PRESSURE: 101 MMHG | HEART RATE: 80 BPM | DIASTOLIC BLOOD PRESSURE: 71 MMHG | BODY MASS INDEX: 27.96 KG/M2 | WEIGHT: 167.8 LBS | TEMPERATURE: 97.6 F | HEIGHT: 65 IN | OXYGEN SATURATION: 98 %

## 2020-12-22 DIAGNOSIS — R20.0 NUMBNESS AND TINGLING OF RIGHT UPPER EXTREMITY: ICD-10-CM

## 2020-12-22 DIAGNOSIS — Z13.29 SCREENING FOR THYROID DISORDER: ICD-10-CM

## 2020-12-22 DIAGNOSIS — R20.2 NUMBNESS AND TINGLING OF LEFT UPPER EXTREMITY: ICD-10-CM

## 2020-12-22 DIAGNOSIS — Z13.1 SCREENING FOR DIABETES MELLITUS: ICD-10-CM

## 2020-12-22 DIAGNOSIS — M54.2 CHRONIC NECK PAIN: Primary | ICD-10-CM

## 2020-12-22 DIAGNOSIS — R20.0 NUMBNESS AND TINGLING OF LEFT UPPER EXTREMITY: ICD-10-CM

## 2020-12-22 DIAGNOSIS — R52 GENERALIZED BODY ACHES: ICD-10-CM

## 2020-12-22 DIAGNOSIS — R20.2 NUMBNESS AND TINGLING OF RIGHT UPPER EXTREMITY: ICD-10-CM

## 2020-12-22 DIAGNOSIS — G89.29 CHRONIC NECK PAIN: ICD-10-CM

## 2020-12-22 DIAGNOSIS — G89.29 CHRONIC NECK PAIN: Primary | ICD-10-CM

## 2020-12-22 DIAGNOSIS — Z13.0 SCREENING FOR DEFICIENCY ANEMIA: ICD-10-CM

## 2020-12-22 DIAGNOSIS — M54.2 CHRONIC NECK PAIN: ICD-10-CM

## 2020-12-22 PROBLEM — J01.01 ACUTE RECURRENT MAXILLARY SINUSITIS: Status: RESOLVED | Noted: 2019-07-29 | Resolved: 2020-12-22

## 2020-12-22 PROBLEM — R10.2 PELVIC CRAMPING: Status: ACTIVE | Noted: 2020-10-08

## 2020-12-22 PROBLEM — N89.8 VAGINAL CYST: Status: ACTIVE | Noted: 2020-06-02

## 2020-12-22 LAB
ALBUMIN SERPL BCP-MCNC: 3.8 G/DL (ref 3.5–5)
ALP SERPL-CCNC: 82 U/L (ref 46–116)
ALT SERPL W P-5'-P-CCNC: 32 U/L (ref 12–78)
ANION GAP SERPL CALCULATED.3IONS-SCNC: 3 MMOL/L (ref 4–13)
AST SERPL W P-5'-P-CCNC: 20 U/L (ref 5–45)
BASOPHILS # BLD AUTO: 0.03 THOUSANDS/ΜL (ref 0–0.1)
BASOPHILS NFR BLD AUTO: 1 % (ref 0–1)
BILIRUB SERPL-MCNC: 0.29 MG/DL (ref 0.2–1)
BUN SERPL-MCNC: 10 MG/DL (ref 5–25)
CALCIUM SERPL-MCNC: 9.2 MG/DL (ref 8.3–10.1)
CHLORIDE SERPL-SCNC: 106 MMOL/L (ref 100–108)
CO2 SERPL-SCNC: 30 MMOL/L (ref 21–32)
CREAT SERPL-MCNC: 0.78 MG/DL (ref 0.6–1.3)
EOSINOPHIL # BLD AUTO: 0.12 THOUSAND/ΜL (ref 0–0.61)
EOSINOPHIL NFR BLD AUTO: 2 % (ref 0–6)
ERYTHROCYTE [DISTWIDTH] IN BLOOD BY AUTOMATED COUNT: 12.4 % (ref 11.6–15.1)
EST. AVERAGE GLUCOSE BLD GHB EST-MCNC: 100 MG/DL
GFR SERPL CREATININE-BSD FRML MDRD: 99 ML/MIN/1.73SQ M
GLUCOSE P FAST SERPL-MCNC: 82 MG/DL (ref 65–99)
HBA1C MFR BLD: 5.1 %
HCT VFR BLD AUTO: 42 % (ref 34.8–46.1)
HGB BLD-MCNC: 13.9 G/DL (ref 11.5–15.4)
IMM GRANULOCYTES # BLD AUTO: 0.01 THOUSAND/UL (ref 0–0.2)
IMM GRANULOCYTES NFR BLD AUTO: 0 % (ref 0–2)
LYMPHOCYTES # BLD AUTO: 1.53 THOUSANDS/ΜL (ref 0.6–4.47)
LYMPHOCYTES NFR BLD AUTO: 28 % (ref 14–44)
MCH RBC QN AUTO: 30.3 PG (ref 26.8–34.3)
MCHC RBC AUTO-ENTMCNC: 33.1 G/DL (ref 31.4–37.4)
MCV RBC AUTO: 92 FL (ref 82–98)
MONOCYTES # BLD AUTO: 0.51 THOUSAND/ΜL (ref 0.17–1.22)
MONOCYTES NFR BLD AUTO: 9 % (ref 4–12)
NEUTROPHILS # BLD AUTO: 3.26 THOUSANDS/ΜL (ref 1.85–7.62)
NEUTS SEG NFR BLD AUTO: 60 % (ref 43–75)
NRBC BLD AUTO-RTO: 0 /100 WBCS
PLATELET # BLD AUTO: 148 THOUSANDS/UL (ref 149–390)
PMV BLD AUTO: 13 FL (ref 8.9–12.7)
POTASSIUM SERPL-SCNC: 3.9 MMOL/L (ref 3.5–5.3)
PROT SERPL-MCNC: 7.5 G/DL (ref 6.4–8.2)
RBC # BLD AUTO: 4.59 MILLION/UL (ref 3.81–5.12)
RHEUMATOID FACT SER QL LA: NEGATIVE
SODIUM SERPL-SCNC: 139 MMOL/L (ref 136–145)
TSH SERPL DL<=0.05 MIU/L-ACNC: 1.81 UIU/ML (ref 0.36–3.74)
VIT B12 SERPL-MCNC: 1111 PG/ML (ref 100–900)
WBC # BLD AUTO: 5.46 THOUSAND/UL (ref 4.31–10.16)

## 2020-12-22 PROCEDURE — 85025 COMPLETE CBC W/AUTO DIFF WBC: CPT

## 2020-12-22 PROCEDURE — 3725F SCREEN DEPRESSION PERFORMED: CPT | Performed by: PHYSICIAN ASSISTANT

## 2020-12-22 PROCEDURE — 86618 LYME DISEASE ANTIBODY: CPT

## 2020-12-22 PROCEDURE — 83036 HEMOGLOBIN GLYCOSYLATED A1C: CPT

## 2020-12-22 PROCEDURE — 86430 RHEUMATOID FACTOR TEST QUAL: CPT

## 2020-12-22 PROCEDURE — 80053 COMPREHEN METABOLIC PANEL: CPT

## 2020-12-22 PROCEDURE — 84443 ASSAY THYROID STIM HORMONE: CPT

## 2020-12-22 PROCEDURE — 99203 OFFICE O/P NEW LOW 30 MIN: CPT | Performed by: PHYSICIAN ASSISTANT

## 2020-12-22 PROCEDURE — 72050 X-RAY EXAM NECK SPINE 4/5VWS: CPT

## 2020-12-22 PROCEDURE — 82607 VITAMIN B-12: CPT

## 2020-12-22 PROCEDURE — 1036F TOBACCO NON-USER: CPT | Performed by: PHYSICIAN ASSISTANT

## 2020-12-22 PROCEDURE — 86038 ANTINUCLEAR ANTIBODIES: CPT

## 2020-12-22 PROCEDURE — 3008F BODY MASS INDEX DOCD: CPT | Performed by: PHYSICIAN ASSISTANT

## 2020-12-22 PROCEDURE — 36415 COLL VENOUS BLD VENIPUNCTURE: CPT

## 2020-12-23 LAB
B BURGDOR IGG+IGM SER-ACNC: 12
RYE IGE QN: NEGATIVE

## 2021-01-12 ENCOUNTER — TRANSCRIBE ORDERS (OUTPATIENT)
Dept: ADMINISTRATIVE | Facility: HOSPITAL | Age: 36
End: 2021-01-12

## 2021-01-12 ENCOUNTER — OFFICE VISIT (OUTPATIENT)
Dept: INTERNAL MEDICINE CLINIC | Facility: CLINIC | Age: 36
End: 2021-01-12

## 2021-01-12 VITALS
OXYGEN SATURATION: 98 % | HEART RATE: 68 BPM | BODY MASS INDEX: 27.42 KG/M2 | SYSTOLIC BLOOD PRESSURE: 119 MMHG | TEMPERATURE: 97.8 F | DIASTOLIC BLOOD PRESSURE: 77 MMHG | WEIGHT: 164.6 LBS | HEIGHT: 65 IN

## 2021-01-12 DIAGNOSIS — N64.4 BREAST PAIN IN FEMALE: ICD-10-CM

## 2021-01-12 DIAGNOSIS — M54.2 CHRONIC NECK PAIN: Primary | ICD-10-CM

## 2021-01-12 DIAGNOSIS — R10.2 PELVIC CRAMPING: ICD-10-CM

## 2021-01-12 DIAGNOSIS — R92.8 ABNORMALITY OF LEFT BREAST ON SCREENING MAMMOGRAM: ICD-10-CM

## 2021-01-12 DIAGNOSIS — R52 COMPLAINTS OF TOTAL BODY PAIN: ICD-10-CM

## 2021-01-12 DIAGNOSIS — R20.0 NUMBNESS AND TINGLING OF BOTH UPPER EXTREMITIES: ICD-10-CM

## 2021-01-12 DIAGNOSIS — G47.00 INSOMNIA, UNSPECIFIED TYPE: ICD-10-CM

## 2021-01-12 DIAGNOSIS — R20.2 NUMBNESS AND TINGLING OF BOTH UPPER EXTREMITIES: ICD-10-CM

## 2021-01-12 DIAGNOSIS — N64.4 PAINFUL BREASTS: Primary | ICD-10-CM

## 2021-01-12 DIAGNOSIS — G89.29 CHRONIC NECK PAIN: Primary | ICD-10-CM

## 2021-01-12 DIAGNOSIS — G44.229 CHRONIC TENSION-TYPE HEADACHE, NOT INTRACTABLE: ICD-10-CM

## 2021-01-12 PROCEDURE — 3008F BODY MASS INDEX DOCD: CPT | Performed by: PHYSICIAN ASSISTANT

## 2021-01-12 PROCEDURE — 99214 OFFICE O/P EST MOD 30 MIN: CPT | Performed by: PHYSICIAN ASSISTANT

## 2021-01-12 PROCEDURE — 1036F TOBACCO NON-USER: CPT | Performed by: PHYSICIAN ASSISTANT

## 2021-01-12 RX ORDER — METHOCARBAMOL 500 MG/1
500 TABLET, FILM COATED ORAL 3 TIMES DAILY PRN
Qty: 30 TABLET | Refills: 2 | Status: SHIPPED | OUTPATIENT
Start: 2021-01-12

## 2021-01-12 RX ORDER — GABAPENTIN 100 MG/1
CAPSULE ORAL
Qty: 120 CAPSULE | Refills: 1 | Status: SHIPPED | OUTPATIENT
Start: 2021-01-12 | End: 2021-02-08

## 2021-01-12 NOTE — PROGRESS NOTES
Assessment/Plan:      Diagnoses and all orders for this visit:    Chronic neck pain  -     methocarbamol (ROBAXIN) 500 mg tablet; Take 1 tablet (500 mg total) by mouth 3 (three) times a day as needed for muscle spasms (neck and head pain)  -     gabapentin (NEURONTIN) 100 mg capsule; Take 1 caps PO in AM and 3 tabs PO QHS  -     Ambulatory referral to Rheumatology; Future    Numbness and tingling of both upper extremities  -     methocarbamol (ROBAXIN) 500 mg tablet; Take 1 tablet (500 mg total) by mouth 3 (three) times a day as needed for muscle spasms (neck and head pain)  -     gabapentin (NEURONTIN) 100 mg capsule; Take 1 caps PO in AM and 3 tabs PO QHS  -     Ambulatory referral to Rheumatology; Future    Complaints of total body pain  -     methocarbamol (ROBAXIN) 500 mg tablet; Take 1 tablet (500 mg total) by mouth 3 (three) times a day as needed for muscle spasms (neck and head pain)  -     gabapentin (NEURONTIN) 100 mg capsule; Take 1 caps PO in AM and 3 tabs PO QHS  -     Ambulatory referral to Rheumatology; Future    Chronic tension-type headache, not intractable  -     methocarbamol (ROBAXIN) 500 mg tablet; Take 1 tablet (500 mg total) by mouth 3 (three) times a day as needed for muscle spasms (neck and head pain)  -     gabapentin (NEURONTIN) 100 mg capsule; Take 1 caps PO in AM and 3 tabs PO QHS  -     Ambulatory referral to Rheumatology; Future    Insomnia, unspecified type  -     gabapentin (NEURONTIN) 100 mg capsule; Take 1 caps PO in AM and 3 tabs PO QHS    Abnormality of left breast on screening mammogram  -     Cancel: Mammo diagnostic bilateral w cad; Future    Breast pain in female  -     Cancel: Mammo diagnostic bilateral w cad; Future    Pelvic cramping      35y/o female here today for f/u since establishing with our office for multiple  Chronic concerns and symptoms   Vit B12, TSH WNL, lyme testing negative, RF and WILMER normal, HA1C was normal at 5 1, CBC and CMP without any significant abnormalities to contribute to her sxs  Cervical spine xray reviewed with pt today straightening of the normal cervical curvature with slight smooth reversal  Otherwise vertebrae and disk spaces intact  Patient reports same symptoms as discussed at last visit primarily with neck pain and stiffness with pain radiating into her arms and hands with numbness and tingling in hands and fingers  She also gets pain in the back of the head as well as pain in the frontal region as well as in and around her eyes so I do question a possible tension headache or migrainous component as well  Of note, she does have some symmetrical mild decreased  strength bilaterally but no obvious other neurological abnormalities regarding visual field deficit, facial asymmetry or significant weakness that is asymmetrical in upper or lower extremities  I explained to patient the meaning of the results however patient is still concerned about lupus or fibromyalgia  Due to her widespread pain I would like 2nd opinion from Rheumatology with referral provided  I will trial her on Robaxin 500 mg muscle relaxer t i d  with caution as it may cause drowsiness to help alleviate some of the spasm in her neck  There is no obvious decreased disc space to be concern for cervical radiculopathy but may need to consider MRI and EMG studies if symptoms are not improved  Will also trial gabapentin nerve pain medication  She is concerned about drowsy side effects so I will trial her on 100 mg in the morning and 300 mg before bed  Once again based on workup through Rheumatology and response to medications may need to consider MRI of the cervical spine and EMG study of the upper extremities depending  I am hopeful that gabapentin may help the headache and she does report some difficulty with sleeping secondary to the pain so it may help with drowsiness to help her sleep better at night  We will monitor this closely      Patient also brought up today that she has been having bilateral breast discomfort and she was told she needs to get a mammogram every 6 months  I reviewed mammogram done in past and was last done in May recommending six-month follow-up with stable asymmetry  Patient states that she does not really want to keep getting mammograms that they are not necessary however I told her that this is the recommendation of the radiologist reading the test and she is agreeable to getting another test done  Since she has bilateral breast discomfort I will order diagnostic bilateral breast mammogram   Patient had scheduled the appointment before I finished her note and it appears the schedule has changed the order to a 3D but is not linked to the encounter  However patient does have an appointment scheduled for diagnostic bilateral 3D mammogram       Patient also continuing to complain of pelvic pain and cramping which is intermittent  It does not seem gastrointestinal in nature she has no changes to her bowel habits, no constipation, diarrhea, gas or blood in stool  She has had a workup through [facility] with what appears to be endometrial biopsy, Pap testing as well as an ultrasound of the pelvis which has not really shown any significant abnormalities  She does report some discomfort the past 2 weeks and some discomfort doctor sexual intercourse, 1 day of bleeding  Will defer to gyn and encouraged her to schedule a follow-up appointment  We will plan to see patient back in the office in about 6 weeks for follow-up to her symptoms and see if the medicines are helping her  She can call sooner with any concerns regarding worsening symptoms or any problems with the medicine      Chief Complaint   Patient presents with    Follow-up     neck pain and eye pain and blurried vision ,in the morning patient feel numbness in the face and arms            Subjective:     Patient ID: Tracy Ferrell is a 29 y o  female     35y/o female here today for f/u multiple chronic sxs as discussed at last visit to establish and review BW and neck xray results  She continues with chronic neck pain into arms  She continue getting weakness, N/T in B/L arms and hands  She also notes some sxs described as "shaking" in her neck and back of head  She notes head pain occipital region, as well as some pain in her eyes and frontal region  Pain in back for head also goes into her neck and into her arms  Sometimes gets some numbness in her face as well  She reports having PT I the past for sxs and it didn't help  Sometimes she feels general soreness in muscles a joints all over, mainly upper body  She drinks a lot of teas with nickie and tumeric, also vit B 6 and B12  Also takes vit D3    All of these sxs are pre-existing  Patient also states she was told she had to get mammogram in 6 months left breast and was due in november, last done may 2020  She state she now has B/L breast pain and soreness x 2-3 weeks, no nipple drainage or skin changes  Will need updated mammogram      She also c/o pelvic pain  And has been seen through [facility], states had biopsy and was ok  States after sexual intercourse with her  she had some pain in vaginal pain and feels like bloated  Feels for 2 weeks  She reports some vaginal bleeding 1 time after sexual intercourse  She denies any constipation or diarrhea, she passes BM once a day and that regimen is unchanged  Denies blood in stool  Review of Systems   Constitutional: Negative  Eyes:        As in HPI   Respiratory: Negative  Cardiovascular: Negative  Gastrointestinal: Negative  Genitourinary:        As in HPI   Musculoskeletal:        As in HPI   Skin: Negative  Neurological:        As in HPI   Psychiatric/Behavioral: Positive for sleep disturbance (trouble sleeping at night mainly due to pain)  The patient is nervous/anxious (mild, intermittent reportedly)            The following portions of the patient's history were reviewed and updated as appropriate: allergies, current medications, past family history, past medical history, past social history, past surgical history and problem list       Objective:     Physical Exam  Vitals signs reviewed  Constitutional:       General: She is not in acute distress  Appearance: Normal appearance  She is not ill-appearing or toxic-appearing  Comments: Mild overweight BMI 27 39   HENT:      Head: Normocephalic and atraumatic  Eyes:      General:         Right eye: No discharge  Left eye: No discharge  Extraocular Movements: Extraocular movements intact  Conjunctiva/sclera: Conjunctivae normal       Pupils: Pupils are equal, round, and reactive to light  Comments: No gross visual field deficits today   Neck:      Musculoskeletal: Decreased range of motion (mild in all directions)  Pain with movement (B/L) and muscular tenderness present  No neck rigidity or spinous process tenderness  Thyroid: No thyroid tenderness  Vascular: Normal carotid pulses  No carotid bruit  Trachea: Phonation normal  No tracheal tenderness  Comments: No specific neck masses palpated or visible  Cardiovascular:      Rate and Rhythm: Normal rate and regular rhythm  Pulses: Normal pulses  Radial pulses are 2+ on the right side and 2+ on the left side  Dorsalis pedis pulses are 2+ on the right side and 2+ on the left side  Heart sounds: Normal heart sounds  Pulmonary:      Effort: Pulmonary effort is normal       Breath sounds: Normal breath sounds  Chest:      Comments: Breast exam deferred today as this is new complaint/concern and appt was for folow up to neck and head pain, review tests and UE pain, weakness, N/T  Abdominal:      General: Abdomen is flat  Bowel sounds are normal       Palpations: Abdomen is soft  Tenderness:  There is abdominal tenderness (mild right low pelvic region without swelling or mass palpated  )  Hernia: There is no hernia in the left inguinal area or right inguinal area  Musculoskeletal:      Right lower leg: No edema  Left lower leg: No edema  Comments: Neck exam as above  Decreased  strength B/L 4/5, but mainly symmetrical  Normal 5/5 strength of upper arms at shoulders against resistance  Lymphadenopathy:      Head:      Right side of head: No submandibular or tonsillar adenopathy  Left side of head: No submandibular or tonsillar adenopathy  Neurological:      Mental Status: She is alert and oriented to person, place, and time  Sensory: Sensation is intact  Motor: Motor function is intact  Coordination: Coordination is intact  Gait: Gait is intact  Psychiatric:         Mood and Affect: Mood normal          Speech: Speech normal          Behavior: Behavior normal  Behavior is cooperative           Vitals:    01/12/21 0925   BP: 119/77   BP Location: Right arm   Patient Position: Sitting   Cuff Size: Standard   Pulse: 68   Temp: 97 8 °F (36 6 °C)   TempSrc: Temporal   SpO2: 98%   Weight: 74 7 kg (164 lb 9 6 oz)   Height: 5' 5" (1 651 m)

## 2021-01-19 ENCOUNTER — HOSPITAL ENCOUNTER (OUTPATIENT)
Dept: MAMMOGRAPHY | Facility: CLINIC | Age: 36
Discharge: HOME | End: 2021-01-19
Payer: COMMERCIAL

## 2021-01-19 ENCOUNTER — HOSPITAL ENCOUNTER (OUTPATIENT)
Dept: ULTRASOUND IMAGING | Facility: CLINIC | Age: 36
Discharge: HOME | End: 2021-01-19
Payer: COMMERCIAL

## 2021-01-19 VITALS — WEIGHT: 164 LBS | BODY MASS INDEX: 27.32 KG/M2 | HEIGHT: 65 IN

## 2021-01-19 DIAGNOSIS — N64.4 PAINFUL BREASTS: ICD-10-CM

## 2021-01-19 PROCEDURE — 77066 DX MAMMO INCL CAD BI: CPT

## 2021-01-19 PROCEDURE — G0279 TOMOSYNTHESIS, MAMMO: HCPCS

## 2021-01-26 DIAGNOSIS — R20.0 NUMBNESS AND TINGLING OF BOTH UPPER EXTREMITIES: ICD-10-CM

## 2021-01-26 DIAGNOSIS — R52 COMPLAINTS OF TOTAL BODY PAIN: ICD-10-CM

## 2021-01-26 DIAGNOSIS — R51.9 PERSISTENT HEADACHES: Primary | ICD-10-CM

## 2021-01-26 DIAGNOSIS — R20.2 NUMBNESS AND TINGLING OF BOTH UPPER EXTREMITIES: ICD-10-CM

## 2021-02-03 ENCOUNTER — TELEPHONE (OUTPATIENT)
Dept: NEUROLOGY | Facility: CLINIC | Age: 36
End: 2021-02-03

## 2021-02-03 NOTE — TELEPHONE ENCOUNTER
Best contact number for patient: [phone]     Emergency Contact name and number: Kena Earl [phone]    Referring provider and telephone number:    Primary Care Provider Name and if affiliated with [address] [place] [address]:     Reason for Appointment/Dx:    [place] [facility] [place] received? Yes                                                 Records Received? Yes    Have you ever seen another Neurologist?       No    Insurance Information    Insurance Name:    ID/Policy #:    Secondary Insurance:    ID/Policy#: Workman's Comp/ Accident/ School  Information      Workman's Comp/Accident/School related?        No    If yes name of Insurance company:    Date of Injury:    Type of Injury:    [address] Name and Telephone Number:    Notes:                   Appointment date: May 2021

## 2021-02-05 DIAGNOSIS — M54.2 CHRONIC NECK PAIN: ICD-10-CM

## 2021-02-05 DIAGNOSIS — R20.2 NUMBNESS AND TINGLING OF BOTH UPPER EXTREMITIES: ICD-10-CM

## 2021-02-05 DIAGNOSIS — G44.229 CHRONIC TENSION-TYPE HEADACHE, NOT INTRACTABLE: Primary | ICD-10-CM

## 2021-02-05 DIAGNOSIS — R52 COMPLAINTS OF TOTAL BODY PAIN: ICD-10-CM

## 2021-02-05 DIAGNOSIS — G89.29 CHRONIC NECK PAIN: ICD-10-CM

## 2021-02-05 DIAGNOSIS — R20.0 NUMBNESS AND TINGLING OF BOTH UPPER EXTREMITIES: ICD-10-CM

## 2021-02-08 ENCOUNTER — TELEMEDICINE (OUTPATIENT)
Dept: INTERNAL MEDICINE CLINIC | Facility: CLINIC | Age: 36
End: 2021-02-08

## 2021-02-08 VITALS
DIASTOLIC BLOOD PRESSURE: 75 MMHG | SYSTOLIC BLOOD PRESSURE: 121 MMHG | BODY MASS INDEX: 28.32 KG/M2 | HEART RATE: 85 BPM | HEIGHT: 65 IN | OXYGEN SATURATION: 99 % | TEMPERATURE: 97.8 F | WEIGHT: 170 LBS

## 2021-02-08 DIAGNOSIS — G89.29 CHRONIC NECK PAIN: ICD-10-CM

## 2021-02-08 DIAGNOSIS — J03.90 TONSILLITIS: Primary | ICD-10-CM

## 2021-02-08 DIAGNOSIS — N64.4 BREAST PAIN IN FEMALE: ICD-10-CM

## 2021-02-08 DIAGNOSIS — R07.0 THROAT PAIN IN ADULT: ICD-10-CM

## 2021-02-08 DIAGNOSIS — M54.2 CHRONIC NECK PAIN: ICD-10-CM

## 2021-02-08 PROCEDURE — 87070 CULTURE OTHR SPECIMN AEROBIC: CPT | Performed by: PHYSICIAN ASSISTANT

## 2021-02-08 PROCEDURE — 99214 OFFICE O/P EST MOD 30 MIN: CPT | Performed by: PHYSICIAN ASSISTANT

## 2021-02-08 NOTE — PROGRESS NOTES
Assessment/Plan:      Diagnoses and all orders for this visit:    Tonsillitis  -     Ambulatory Referral to Otolaryngology; Future  -     Throat culture; Future  -     Throat culture    Throat pain in adult  -     US thyroid; Future  -     Throat culture; Future  -     Throat culture    Chronic neck pain  -     US thyroid; Future    Breast pain in female        Patient is a 56-year-old female presenting today to discuss ongoing issues of throat pain intermittently for a while  She states that she was supposed to see ENT last year but was canceled due to the pandemic  Details of her symptoms as in HPI  There is no obvious abnormalities today that will cause her symptoms though she states that she is having symptoms at present  I used the [address]  To explain everything to her  I explained to patient that she might be having some tonsillitis intermittently which is an inflammation in the tonsils but not necessarily an infection  The white that she might be seeing in the holes of her tonsils could be tonsilliths that I explained could be from buildup of food particles and saliva that can become odorous and cause irritation to the tonsils  However none of this was seen today during examination  She has no documented repeated strep infections and her last rapid strep test was in 2018 and was negative  I will refer her to ENT for further evaluation since I am unable to perform a thorough exam deeper than the posterior pharynx  I did perform a throat swab today to send out for culture to rule out infection however once again no abnormal findings to suspect an active infection at this time so I will hold off on any treatment  I did advised patient perform saltwater gargles 2 to 3 times a day every day to help prevent buildup in her tonsils  She will schedule an appointment with ENT  We will call her with results of her throat culture        Patient also reporting warmth in the front of her neck as well  I cannot palpate any distinct asymmetry or mass of her thyroid  However since she has been having so many complaints in her neck and now her throat I will obtain an ultrasound of her thyroid and soft tissue of the neck to rule out underlying mass or abnormality  Patient at the end of the visit also complaining of ongoing breast pain in bilateral breasts without any rash, redness or nipple discharge  She had similar complaint documented in 2019 as well  She reports pain is in both breasts and they feel swollen and heavy and also feel warm at times  Patient showed me her left breast and there does not appear to be any swelling, redness or warmth of the breast, no nipple discharge  I did perform an exam last month and also sent her to get 3D diagnostic mammogram imaging with right breast showing a focal asymmetry that is not appearing concerning and is stable since last imaging in 2019, left breast normal   Patient will follow-up with gynecology today to reassess  Subjective:     Patient ID: Tracy Ferrell is a 29 y o  female     37y/o female here today for discussion of throat problems  She states she gets a lot of redness in back of throat  Also states gets white spots back of throat dn they can smell  States she does get some discomfort in back of throat every day  states has been on antibiotics in the past, I dont see any throat cultures/rapid strep tests since 2018, which was negative  states the throat sometimes feels hot and dry  States she feels the pain all the time in the throat  Also feels warm in her anterior neck  She denies any rash or actual heat to neck  States in her ears she can also feel a little pain  States she was referred to ENT in past but had to cancel due to covid pandemic and never saw them  Patient has +HPV on PAP in the past and concerned about getting that in her throat      Pt has continued concern of B/L breast pain and thinks they feel hot and swollen  Had 3D diagnostic mammogram last month showing a small focal asymmetry, stable from last imaging study  Normal left breast  No nipple discharge, redness or rash of the breasts  Review of Systems   Constitutional: Negative  HENT:        As in HPI   Eyes: Negative  Respiratory: Negative  Cardiovascular: Negative  Skin:        Breast sxs as in HPI         The following portions of the patient's history were reviewed and updated as appropriate: allergies, current medications, past family history, past medical history, past social history, past surgical history and problem list       Objective:     Physical Exam  Vitals signs reviewed  Constitutional:       General: She is not in acute distress  Appearance: She is not ill-appearing or toxic-appearing  HENT:      Head: Normocephalic and atraumatic  Right Ear: Tympanic membrane, ear canal and external ear normal       Left Ear: Tympanic membrane, ear canal and external ear normal       Nose: Nose normal       Mouth/Throat:      Tongue: No lesions  Palate: No lesions  Pharynx: Oropharynx is clear  No pharyngeal swelling, oropharyngeal exudate, posterior oropharyngeal erythema or uvula swelling  Tonsils: No tonsillar exudate or tonsillar abscesses  0 on the right  0 on the left  Comments: Tonsils are visible B/L but are not red, enlarged or swollen  The posterior pharynx does not appear red or swollen  No oral lesions are seen to explain her pain or other sxs  Neck:      Musculoskeletal: No edema, erythema or neck rigidity  Thyroid: No thyroid tenderness  Comments: I cannot distinctly palpate any thyroid mass or asymmetry  Lymphadenopathy:      Head:      Right side of head: No submandibular or tonsillar adenopathy  Left side of head: No submandibular or tonsillar adenopathy  Neurological:      Mental Status: She is alert and oriented to person, place, and time     Psychiatric: Mood and Affect: Mood normal          Behavior: Behavior normal  Behavior is cooperative           Vitals:    02/08/21 1005   BP: 121/75   BP Location: Right arm   Patient Position: Sitting   Cuff Size: Standard   Pulse: 85   Temp: 97 8 °F (36 6 °C)   TempSrc: Temporal   SpO2: 99%   Weight: 77 1 kg (170 lb)   Height: 5' 5" (1 651 m)

## 2021-02-09 ENCOUNTER — HOSPITAL ENCOUNTER (OUTPATIENT)
Dept: RADIOLOGY | Facility: HOSPITAL | Age: 36
Discharge: HOME | End: 2021-02-09
Payer: COMMERCIAL

## 2021-02-09 DIAGNOSIS — G89.29 CHRONIC NECK PAIN: ICD-10-CM

## 2021-02-09 DIAGNOSIS — R07.0 THROAT PAIN IN ADULT: ICD-10-CM

## 2021-02-09 DIAGNOSIS — M54.2 CHRONIC NECK PAIN: ICD-10-CM

## 2021-02-09 PROCEDURE — 76536 US EXAM OF HEAD AND NECK: CPT

## 2021-02-10 LAB — BACTERIA THROAT CULT: NORMAL

## 2021-02-22 ENCOUNTER — CONSULT (OUTPATIENT)
Dept: PAIN MEDICINE | Facility: CLINIC | Age: 36
End: 2021-02-22
Payer: COMMERCIAL

## 2021-02-22 VITALS
SYSTOLIC BLOOD PRESSURE: 113 MMHG | DIASTOLIC BLOOD PRESSURE: 75 MMHG | HEIGHT: 65 IN | WEIGHT: 159 LBS | HEART RATE: 72 BPM | BODY MASS INDEX: 26.49 KG/M2 | TEMPERATURE: 97.8 F

## 2021-02-22 DIAGNOSIS — R20.2 NUMBNESS AND TINGLING OF BOTH UPPER EXTREMITIES: ICD-10-CM

## 2021-02-22 DIAGNOSIS — R20.0 NUMBNESS AND TINGLING OF BOTH UPPER EXTREMITIES: ICD-10-CM

## 2021-02-22 DIAGNOSIS — M54.2 CHRONIC NECK PAIN: ICD-10-CM

## 2021-02-22 DIAGNOSIS — M25.511 BILATERAL SHOULDER PAIN, UNSPECIFIED CHRONICITY: Primary | ICD-10-CM

## 2021-02-22 DIAGNOSIS — G89.29 CHRONIC NECK PAIN: ICD-10-CM

## 2021-02-22 DIAGNOSIS — M25.512 BILATERAL SHOULDER PAIN, UNSPECIFIED CHRONICITY: Primary | ICD-10-CM

## 2021-02-22 PROCEDURE — 99204 OFFICE O/P NEW MOD 45 MIN: CPT | Performed by: ANESTHESIOLOGY

## 2021-02-22 RX ORDER — MELOXICAM 7.5 MG/1
7.5 TABLET ORAL 2 TIMES DAILY PRN
Qty: 60 TABLET | Refills: 1 | Status: SHIPPED | OUTPATIENT
Start: 2021-02-22

## 2021-02-22 NOTE — PATIENT INSTRUCTIONS
Meloxicam (Por la boca)   Meloxicam ([id number]-yvrose)  Se [place] para el tratamiento de los síntomas de la osteoartritis y la artritis reumatoide  Joleen es un medicamento antiinflamatorio no esteroideo (TAZ)  Rosita(s) : Mobic, Qmiiz, Vivlodex   Existen muchas otras marcas de Gwendolyn  Joleen medicamento no debe ser usado cuando:   Joleen medicamento no es adecuado para todas las personas  No lo use si ha tenido brynn reacción alérgica al meloxicam o a otro medicamento antiinflamatorio no esteroideo, incluyendo la aspirina  No lo use michel antes o después de [facility] del corazón llamada bypass de la arteria coronaria con injerto (CABG)  Forma de usar joleen medicamento:   Alissa Pedraza, Yessi, Tableta para disolver  · Miller ramírez medicamentos jane se le haya indicado  Es probable que sea necesario cambiar reagan dosis varias veces hasta encontrar la que funciona mejor para usted  · Miller joleen medicamento con la comida o con un poco de leche si le causa malestar estomacal   · [place] o tableta: Suzen [place]  No triture, rompa o mastique  · Tableta soluble: Asegúrese de que ramírez courtney estén secas antes de tocar la tableta  No tia el blíster que contiene la tableta hasta que usted esté listo para usarla  Desprenda la lámina del envase para remover la tableta  No empuje la tableta a través del aluminio  Colóquese la tableta en la boca o debajo de la lengua  Se debería disolver con rapidez  Trague la tableta derretida, con o sin líquido  · Solución oral: Agite suavemente el envase antes de usar el medicamento  Mida el líquido oral con Elbertelver Funk, [place] para uso oral o taza especialmente marcadas para medir medicamentos  · Gwendolyn debe venir con Hargill Antu Guía del medicamento  Solicite brynn copia con reagan farmacéutico en mark de no tener la guía  · Si olvida brynn dosis: Miller la dosis tan pronto jane lo recuerde  Si es krystal la hora para reagan próxima dosis, espere hasta entonces para carlos reagan dosis regular   No tome medicamento adicional para reponer la dosis que omitió  · Guarde el medicamento en un recipiente cerrado a temperatura ambiente y alejado del calor, la humedad y la anastasia directa  Medicamentos y [employer] que debe evitar:   Consulte con reagan médico o farmacéutico antes de usar cualquier medicamento, incluyendo los que compra sin receta médica, las vitaminas y los productos herbales  · No use aspirina o cualquier otro TAZ (incluyendo diflunisal, salsalato) a menos que reagan médico lo autorice  · No tome la solución oral junto con sulfonato de sodio de poliestireno  La solución oral de meloxicam contiene sorbitol, que puede causar un problema intestinal grave si se wilmer junto con sulfonato de sodio de poliestireno  · Algunos medicamentos pueden interferir con los resultados del meloxicam  Informe a reagan médico si está usando cualquiera de los siguientes:  ? Amiodarona, cloestiramina, ciclosporina, digoxina, fluconazol, litio, metotrexato, pemetrexed, sulfafenazol  ? Medicamentos para la presión arterial  ? Anticoagulantes (incluyendo warfarina)  ? Diurético  ? Medicamentos para tratar la depresión (inhibidores selectivos de reabsorción de serotonina, o ISRS, o inhibidores selectivos de reabsorción de serotonina y norepinefrina, o ISRSN)  ? Medicamentos esteroideos (incluyendo hidrocortisona, metilprednisolona, prednisolona, prednisona)  Precauciones billy el uso de joleen medicamento:   · Informe a reagan médico si está embarazada o amamantando  No use joleen medicamento billy la última etapa del embarazo a menos que reagan médico la autorice  · Informe a reagan médico si usted tiene enfermedad renal, enfermedad hepática, enfermedad cardíaca, asma, problemas de sangrado, presión arterial maeve, cualquier problema cardíaco o vascular, fenilcetonuria, ataque cardíaco reciente o antecedentes de úlceras u otros problemas estomacales  Informe a reagan médico si usted fuma o consume alcohol con regularidad    · Joleen medicamento puede causar los siguientes problemas:  ? Riesgo alto de coágulos de [place], ataque al corazón, derrame cerebral o insuficiencia cardíaca  ? Problemas de sangrado, incluyendo sangrado o úlceras estomacales o intestinales  ? Problemas hepáticos  ? Presión arterial maeve  ? Problemas renales  ? Reacciones graves en la piel  · Joleen medicamento puede causar un retraso en la ovulación de las mujeres y puede disminuir el conteo de espermatozoides en los hombres, lo que puede afectar reagan capacidad para tener hijos  Si planea tener hijos, hable con reagan médico antes de usar [employer]  · El médico solicitará exámenes de laboratorio billy las citas de rutina para revisar los efectos de [employer]  Asista a todas ramírez citas  · Guarde todos los medicamentos fuera del alcance de los niños  Nunca comparta ramírez medicamentos con [employer]  Efectos secundarios que pueden presentarse billy el uso de joleen medicamento:   Consulte inmediatamente con el médico si nota cualquiera de estos efectos secundarios:  · Reacción alérgica: Comezón o ronchas, hinchazón del liyah o las courtney, hinchazón u hormigueo en la boca o garganta, opresión en el pecho, dificultad para respirar  · Ampollas, despellejamiento, sarpullido tobar en la piel  · Cambios en la cantidad o frecuencia con que orina, dolor al Arie-Stephany  · Dolor en el pecho, dificultad para respirar, expulsión de renato al toser  · [employer] o heces pálidas, náuseas, vómitos, falta de apetito, dolor estomacal, coloración amarillenta en la piel u ojos  · Adormecimiento o debilidad en un lado del cuerpo, dolor de jorge repentino o severo, problemas con el habla, la visión o para caminar  · Aumento rápido de peso, inflamación en ramírez courtney, tobillos, o pies  · Dolor de [employer], vómitos con [place] o un material parecido a la borra de café, evacuaciones intestinales con renato o negras y alquitranadas  · Pedralva, moretones o debilidad inusuales    Consulte con el médico si nota los siguientes efectos secundarios menos graves:   · Náuseas leves, diarrea, acidez estomacal  Consulte con el médico si nota otros efectos secundarios que hanane son causados por noe medicamento  Llame a reagan médico para consultarle Billie  Usted puede notificar ramírez efectos secundarios al FDA al [phone]  © Copyright [address] Information is for End User's use only and may not be sold, redistributed or otherwise used for commercial purposes  Esta información es sólo para uso en educación  Reagan intención no es darle un consejo médico sobre enfermedades o tratamientos  Colsulte con reagan Catherne Scott farmacéutico antes de seguir cualquier régimen médico para saber si es seguro y efectivo para usted

## 2021-02-22 NOTE — PROGRESS NOTES
Assessment  1  Bilateral shoulder pain, unspecified chronicity    2  Chronic neck pain    3  Numbness and tingling of both upper extremities        Plan    27-year-old female referred by EAN Chacon PA-C, presenting for initial consultation regarding a greater than 2 year history of neck pain with numbness and paresthesias in bilateral upper extremities without any specific trauma or inciting event  X-ray of the cervical spine was unremarkable  She has done physical therapy years ago without much relief  She is currently taking methocarbamol 500 mg q 8 hours p r n  with mild relief  Tylenol and OTC NSAIDs have been ineffective  Patient's neck pain does have a myofascial component  Upper extremity symptoms may be radicular in nature verses peripheral neuropathy verses entrapment syndrome  She did have a positive Neer, empty can, and Solomon test bilaterally indicating possible rotator cuff pathology contributing to her symptoms  1  I will prescribe physical therapy as I feel the patient may benefit from Jerline Heike based exercises  2  I will prescribe a trial of meloxicam 7 5 mg b i d  p r n  and the patient was advised to avoid any other NSAIDs while on this medication  3  Patient may continue with methocarbamol as prescribed  4  If the patient fails conservative therapy we may consider an MRI of the cervical spine without contrast  5  I will follow up the patient in 6 weeks       My impressions and treatment recommendations were discussed in detail with the patient who verbalized understanding and had no further questions  Discharge instructions were provided  I personally saw and examined the patient and I agree with the above discussed plan of care  No orders of the defined types were placed in this encounter  No orders of the defined types were placed in this encounter  History of Present Illness    Anabel Route is a 29 y o  female referred by EAN Chacon PA-C, presenting for initial consultation regarding a greater than 2 year history of neck pain with numbness and paresthesias in bilateral upper extremities without any specific trauma or inciting event  She denies any weakness of the upper extremities  She denies any bladder or bowel incontinence or balance issues  X-ray of the cervical spine was unremarkable  She has done physical therapy years ago without much relief  She is currently taking methocarbamol 500 mg q 8 hours p r n  with mild relief  Tylenol and OTC NSAIDs have been ineffective  The patient rates her pain 10/10 on the pain is constant  The pain does not follow any particular pattern throughout the day  The pain is described as burning and numbness  The pain is increased with exercise and decreased with relaxation  Other than as stated above, the patient denies any interval changes in medications, medical condition, mental condition, symptoms, or allergies since the last office visit  I have personally reviewed and/or updated the patient's past medical history, past surgical history, family history, social history, current medications, allergies, and vital signs today  Review of Systems   Constitutional: Negative for fever and unexpected weight change  HENT: Negative for trouble swallowing  Eyes: Negative for visual disturbance  Respiratory: Negative for shortness of breath and wheezing  Cardiovascular: Negative for chest pain and palpitations  Gastrointestinal: Negative for constipation, diarrhea, nausea and vomiting  Endocrine: Negative for cold intolerance, heat intolerance and polydipsia  Genitourinary: Negative for difficulty urinating and frequency  Musculoskeletal: Negative for arthralgias, gait problem, joint swelling and myalgias  Skin: Negative for rash  Neurological: Negative for dizziness, seizures, syncope, weakness and headaches  Hematological: Does not bruise/bleed easily     Psychiatric/Behavioral: Negative for dysphoric mood  All other systems reviewed and are negative  Patient Active Problem List   Diagnosis    Cervical high risk HPV (human papillomavirus) test positive    Vitamin D deficiency    Breast pain in female    Vaginal cyst    Pelvic cramping    Chronic neck pain    Numbness and tingling of both upper extremities    Complaints of total body pain    Chronic tension-type headache, not intractable    Insomnia    Abnormality of left breast on screening mammogram       Past Medical History:   Diagnosis Date    Abnormal Pap smear of cervix     HPV (human papilloma virus) infection        No past surgical history on file  Family History   Problem Relation Age of Onset    Hyperlipidemia Mother     Diabetes Father     Kidney disease Father         on Hemodialysis    No Known Problems Sister     No Known Problems Brother     No Known Problems Daughter     No Known Problems Son     No Known Problems Maternal Grandmother     No Known Problems Maternal Grandfather     Diabetes Paternal Grandfather     Kidney disease Paternal Grandfather     No Known Problems Sister     No Known Problems Maternal Aunt     No Known Problems Maternal Aunt     No Known Problems Maternal Aunt     No Known Problems Paternal Aunt        Social History     Occupational History    Not on file   Tobacco Use    Smoking status: Never Smoker    Smokeless tobacco: Never Used   Substance and Sexual Activity    Alcohol use: Not Currently     Frequency: Monthly or less     Drinks per session: 1 or 2    Drug use: Not Currently    Sexual activity: Yes     Partners: Male     Comment: Rigoberto Bullion        Current Outpatient Medications on File Prior to Visit   Medication Sig    methocarbamol (ROBAXIN) 500 mg tablet Take 1 tablet (500 mg total) by mouth 3 (three) times a day as needed for muscle spasms (neck and head pain)     No current facility-administered medications on file prior to visit          No Known Allergies    Physical Exam    Temp 97 8 °F (36 6 °C) (Oral)   Ht 5' 5" (1 651 m)   Wt 72 1 kg (159 lb)   BMI 26 46 kg/m²     Constitutional: normal, well developed, well nourished, alert, in no distress and non-toxic and no overt pain behavior  Eyes: anicteric  HEENT: grossly intact  Neck: supple, symmetric, trachea midline and no masses   Pulmonary:even and unlabored  Cardiovascular:No edema or pitting edema present  Skin:Normal without rashes or lesions and well hydrated  Psychiatric:Mood and affect appropriate  Neurologic:Cranial Nerves II-XII grossly intact  Musculoskeletal:normal  Gait  Bilateral cervical paraspinals and trapezii tender to palpation and ropy in texture  Bilateral biceps, triceps, and brachioradialis reflexes were 2/4 and symmetrical   Negative Melara's reflex bilaterally  Bilateral upper extremity strength 5/5 in all muscle groups  Sensation intact to light touch in C5 through T1 dermatomes bilaterally  Negative Spurling's bilaterally  Negative Tinel's over bilateral wrists and cubital tunnels  Positive Neer, empty can, and Solomon test bilaterally  Imaging      PACS Images     Show images for XR spine cervical complete 4 or 5 vw non injury   Study Result    CERVICAL SPINE     INDICATION:   M54 2: Cervicalgia  G89 29: Other chronic pain  R20 0: Anesthesia of skin  R20 2: Paresthesia of skin  R20 0: Anesthesia of skin  R20 2: Paresthesia of skin      COMPARISON:  11/12/2012     VIEWS:  XR SPINE CERVICAL COMPLETE 4 OR 5 VW NON INJURY   Images: 5     FINDINGS:     No fracture       Straightening of the normal cervical lordosis with slight smooth reversal   No subluxation    No scoliosis      The intervertebral disc spaces are preserved       The neuroforamina are patent      The prevertebral soft tissues are within normal limits        The lung apices are clear      IMPRESSION:     Unremarkable cervical spine         Workstation performed: [id number]

## 2021-02-23 ENCOUNTER — OFFICE VISIT (OUTPATIENT)
Dept: INTERNAL MEDICINE CLINIC | Facility: CLINIC | Age: 36
End: 2021-02-23

## 2021-02-23 VITALS
HEART RATE: 71 BPM | WEIGHT: 160 LBS | SYSTOLIC BLOOD PRESSURE: 129 MMHG | DIASTOLIC BLOOD PRESSURE: 84 MMHG | HEIGHT: 65 IN | BODY MASS INDEX: 26.66 KG/M2

## 2021-02-23 DIAGNOSIS — G89.29 CHRONIC NECK PAIN: Primary | ICD-10-CM

## 2021-02-23 DIAGNOSIS — R52 COMPLAINTS OF TOTAL BODY PAIN: ICD-10-CM

## 2021-02-23 DIAGNOSIS — R20.0 NUMBNESS AND TINGLING OF BOTH UPPER EXTREMITIES: ICD-10-CM

## 2021-02-23 DIAGNOSIS — G44.229 CHRONIC TENSION-TYPE HEADACHE, NOT INTRACTABLE: ICD-10-CM

## 2021-02-23 DIAGNOSIS — R20.2 NUMBNESS AND TINGLING OF BOTH UPPER EXTREMITIES: ICD-10-CM

## 2021-02-23 DIAGNOSIS — M54.2 CHRONIC NECK PAIN: Primary | ICD-10-CM

## 2021-02-23 PROCEDURE — 1036F TOBACCO NON-USER: CPT | Performed by: PHYSICIAN ASSISTANT

## 2021-02-23 PROCEDURE — 3008F BODY MASS INDEX DOCD: CPT | Performed by: PHYSICIAN ASSISTANT

## 2021-02-23 PROCEDURE — 99213 OFFICE O/P EST LOW 20 MIN: CPT | Performed by: PHYSICIAN ASSISTANT

## 2021-02-23 NOTE — PROGRESS NOTES
Assessment/Plan:      Diagnoses and all orders for this visit:    Chronic neck pain    Chronic tension-type headache, not intractable    Numbness and tingling of both upper extremities    Complaints of total body pain        Patient is a 41-year-old female presenting today for another follow-up to her chronic complaints of pain which is fairly widespread however most concern is in her neck, upper extremities as well as occipital region of her head thought to be tension headache  X-ray of her cervical spine showed straightening concerning for muscle spasm  Again, blood work done in December showing normal rheumatoid factor, WILMER, negative Lyme, normal thyroid, no anemia  She does report having evaluation through a rheumatologist but we have no record of this  States that she was told this is not a rheumatology issue and was referred to PT  Patient also was able to see pain management yesterday with recommendation to return to PT, additional meloxicam to methocarbamol already prescribed by me and return in 6 weeks to consider MRI if no improvement with treatment  Overall patient reports that she has seen some improvement with her neck pain as well as her upper extremity symptoms  She expresses overall satisfaction with her care and her current treatment plan  I do believe a lot of this is from chronic muscle spasm and explained to her what can happen with there is chronic muscle spasm surrounding nerves  She is fully functioning in bilateral upper extremities and again subjectively reports some improvement with her symptoms overall  Stressed to patient the importance of continued use of the  Methocarbamol muscle relaxer in addition to starting the meloxicam   She has yet to make appointment with PT so I encouraged her to do so ASAP and follow-up with Pain Management as scheduled in April  I do not feel any further workup is needed for tension headache as it is slightly improved    Other minor problems as discussed in HPI patient back in 4 months time for general reassessment  She will need a follow-up diagnostic mammogram in July and will discuss that at next follow-up  She should call sooner with any concerns  Chief Complaint   Patient presents with    Follow-up       Subjective:     Patient ID: Tiffanie Durbin is a 29 y o  female     37y/o female here today for ongoing issues with chronic neck pain, B/L UE pain and N/T arms  Saw Pain management yesterday who advised return to PT for other exercises x 6 weeks  Also prescribed meloxicam, which she has not yet started  The methocarbamol I prescribed previously does help  States she did not like gabapentin - states when she took it waking up dizzy  Has since d/c medication  Still with B/L pain in arms,not as bad  Numbness and tingling in fingers also less frequent and severe and improved  Still gets occipital headache but not as bad as it was  Pt states was seen by Rheumatology and states she was told she doesn't have a rheumatology condition and was referred to PT  States overall she is feeling slightly better and willing to follow through with PT and PM, as she states she has not had PT in 3-4 years -did not find it helpful at the time - [facility]  She has no new medical concerns at this time  Briefly reviewed w/ pt mammogram showed no concerning new masses or cacifications to cause breast pain  Focal asymmetry left breast unchanged repeat diag evgeny in 6 months - f/u with GYN  Normal thyroid/soft tissue neck US for throat/anterior neck complaints  Review of Systems   Constitutional: Negative  HENT:        Ongoing throat complaints - pt has appt 3/2 with ENT   Respiratory: Negative  Cardiovascular: Negative  Gastrointestinal: Negative  Genitourinary:        Urinary sxs and pelvic discomfort managed by GYN, resolved with macrobid earlier this month  Musculoskeletal:        As in HPI   Skin: Negative  Neurological:        As in HPI   Psychiatric/Behavioral: Negative  The following portions of the patient's history were reviewed and updated as appropriate: allergies, current medications, past family history, past medical history, past social history, past surgical history and problem list       Objective:     Physical Exam  Vitals signs reviewed  Constitutional:       General: She is not in acute distress  Appearance: Normal appearance  She is not ill-appearing or toxic-appearing  HENT:      Head: Normocephalic and atraumatic  Neck:      Musculoskeletal: Neck supple  Decreased range of motion (all directions)  Pain with movement and muscular tenderness (B/L) present  No edema, erythema, neck rigidity, torticollis or spinous process tenderness  Thyroid: No thyroid mass or thyroid tenderness  Vascular: Normal carotid pulses  No carotid bruit  Trachea: Phonation normal    Cardiovascular:      Rate and Rhythm: Normal rate and regular rhythm  Pulses:           Carotid pulses are 2+ on the right side and 2+ on the left side  Radial pulses are 2+ on the right side and 2+ on the left side  Heart sounds: Normal heart sounds  Pulmonary:      Effort: Pulmonary effort is normal       Breath sounds: Normal breath sounds  Musculoskeletal:      Right lower leg: No edema  Left lower leg: No edema  Comments: Neck exam as above  B/L UE's appear normal, no redness or swelling  No TTP  Normal movement of B/L shoulders, elbows, wrists and fingers w/o obvious pain or limitation   strength mildly decreased B/L but fairly symmetrical  Strength in UE's normal and symmetric   Lymphadenopathy:      Head:      Right side of head: No submandibular or tonsillar adenopathy  Left side of head: No submandibular or tonsillar adenopathy  Neurological:      Mental Status: She is alert and oriented to person, place, and time  Sensory: No sensory deficit        Motor: Motor function is intact  No weakness, tremor or abnormal muscle tone  Coordination: Coordination is intact  Gait: Gait is intact  Psychiatric:         Mood and Affect: Mood normal          Behavior: Behavior normal  Behavior is cooperative           Vitals:    02/23/21 1313   BP: 129/84   BP Location: Right arm   Patient Position: Sitting   Cuff Size: Adult   Pulse: 71   Weight: 72 6 kg (160 lb)   Height: 5' 5" (1 651 m)

## 2021-03-18 ENCOUNTER — EVALUATION (OUTPATIENT)
Dept: PHYSICAL THERAPY | Facility: CLINIC | Age: 36
End: 2021-03-18
Payer: COMMERCIAL

## 2021-03-18 DIAGNOSIS — R22.1 NECK SWELLING: Primary | ICD-10-CM

## 2021-03-18 DIAGNOSIS — R20.2 NUMBNESS AND TINGLING OF BOTH UPPER EXTREMITIES: ICD-10-CM

## 2021-03-18 DIAGNOSIS — M54.12 CERVICAL RADICULOPATHY: Primary | ICD-10-CM

## 2021-03-18 DIAGNOSIS — M54.2 CHRONIC NECK PAIN: ICD-10-CM

## 2021-03-18 DIAGNOSIS — M25.511 BILATERAL SHOULDER PAIN, UNSPECIFIED CHRONICITY: ICD-10-CM

## 2021-03-18 DIAGNOSIS — M25.512 BILATERAL SHOULDER PAIN, UNSPECIFIED CHRONICITY: ICD-10-CM

## 2021-03-18 DIAGNOSIS — G89.29 CHRONIC NECK PAIN: ICD-10-CM

## 2021-03-18 DIAGNOSIS — R20.0 NUMBNESS AND TINGLING OF BOTH UPPER EXTREMITIES: ICD-10-CM

## 2021-03-18 DIAGNOSIS — M54.50 ACUTE MIDLINE LOW BACK PAIN WITHOUT SCIATICA: ICD-10-CM

## 2021-03-18 PROCEDURE — 97161 PT EVAL LOW COMPLEX 20 MIN: CPT | Performed by: PHYSICAL THERAPIST

## 2021-03-18 NOTE — PROGRESS NOTES
PT Evaluation     Today's date: 3/18/2021  Patient name: Berto Lipscomb  : 1985  MRN: [id number]  Referring provider: Cody Conteh DO  Dx:   Encounter Diagnosis     ICD-10-CM    1  Cervical radiculopathy  M54 12    2  Chronic neck pain  M54 2     G89 29    3  Acute midline low back pain without sciatica  M54 5                   Assessment  Assessment details: Pt presents with s/s consistent with a lower c/s radiculopathy with an opening and postural correction preference  She also presents without a Rx for acute LBP with a stability preference  She will benefit from skilled PT services to address her impairments in order to decrease pain and restore function  She will utilize her Direct Access benefits to address her LBP for up to 30 days before d/c to a maintenance HEP at that time    Impairments: abnormal muscle firing, abnormal muscle tone, abnormal or restricted ROM, activity intolerance, impaired physical strength, lacks appropriate home exercise program, pain with function, scapular dyskinesis, poor posture  and poor body mechanics  Understanding of Dx/Px/POC: good   Prognosis: poor  Prognosis details: Pain-magnification behavior, chronicity of symptoms (c/s), low motivation for PT    Goals  STG - 2-4 wks  1) pt will resolve L/S aberrant motion  2) pt will resolve B UE abnormal neural tension  3) pt will improve pain 25%    LTG - 4-6 wks  1) pt will demonstrate bridge endurance > 1 minute  2) pt will resolve B UE symptoms  3) pt will improve pain 50%    Plan  Planned modality interventions: traction  Planned therapy interventions: abdominal trunk stabilization, joint mobilization, manual therapy, neuromuscular re-education, patient education, postural training, strengthening, stretching, therapeutic activities, therapeutic exercise, home exercise program, body mechanics training, flexibility and functional ROM exercises  Frequency: 1x week  Duration in weeks: 6  Treatment plan discussed with: patient        Subjective Evaluation    History of Present Illness  Mechanism of injury: Pt is a 29 y o  female with unremarkable PMHx  She reports a 6-yr Hx of B c/s, UT, and scapular pain into her upper arm as well as daily h/a and a 6-mos Hx of B dorsal hand numbness into the fingers  She reports constant 10/10 pain but does admit to her pain getting worse with work activities  She also presents without Rx for c/o midline LBP x 2 wks without radicular symptoms  She denies any specific SAMMY, notes pain increases with sitting or walking and is worst with STS  Pain  Current pain rating: 10  At best pain rating: 10  At worst pain rating: 10  Quality: sharp, tight, radiating, dull ache, pulling and discomfort  Progression: no change    Social Support  Lives with: spouse    Employment status: working (PT housekeeping)    Diagnostic Tests  X-ray: abnormal (c/s straightening likely 2/2 muscle spasm)  Treatments  Previous treatment: medication  Current treatment: medication  Patient Goals  Patient goals for therapy: decreased pain          Objective     Postural Observations  Seated posture: poor  Standing posture: poor  Correction of posture: has no consistent effect        Palpation   Left   Hypertonic in the cervical paraspinals, levator scapulae, rhomboids, scalenes, suboccipitals and upper trapezius  Tenderness of the cervical paraspinals, levator scapulae, middle trapezius, rhomboids, scalenes, suboccipitals and upper trapezius  Trigger point to cervical paraspinals, scalenes, suboccipitals and upper trapezius  Right   Hypertonic in the cervical paraspinals, levator scapulae, rhomboids, scalenes, suboccipitals and upper trapezius  Tenderness of the cervical paraspinals, levator scapulae, middle trapezius, rhomboids, scalenes, suboccipitals and upper trapezius  Trigger point to cervical paraspinals, scalenes, suboccipitals and upper trapezius       Neurological Testing     Sensation   Cervical/Thoracic Left   Intact: light touch    Right   Intact: light touch    Comments   Left light touch: C2-T1  Right light touch: C2-T1  Reflexes   Left   Biceps (C5/C6): normal (2+)  Brachioradialis (C6): normal (2+)  Triceps (C7): trace (1+)    Right   Biceps (C5/C6): normal (2+)  Brachioradialis (C6): normal (2+)  Triceps (C7): normal (2+)    Active Range of Motion   Cervical/Thoracic Spine       Cervical  Subcranial protraction:  WFL and with pain   Subcranial retraction:   Restriction level: moderate  Flexion:  with pain Restriction level: moderate  Extension:  WFL  Left lateral flexion:  with pain Restriction level: minimal  Right lateral flexion:  with pain Restriction level minimal  Left rotation:  with pain Restriction level: moderate  Right rotation:  with pain Restriction level: moderate    Lumbar   Flexion:  WFL and with pain  Extension:  with pain Restriction level: moderate  Left lateral flexion:  WFL  Right lateral flexion:  WFL  Left rotation:  WFL  Right rotation:  [facility]    Additional Active Range of Motion Details  (+) aberrant motion noted upon return from FF    Joint Play   Joints within functional limits: L1, L2, L3 and S1     Hypomobile: L4 and L5     Pain: L4 and L5     Strength/Myotome Testing   Cervical Spine     Left   Interossei strength (t1): 4  Neck lateral flexion (C3): 3+    Right   Interossei strength (t1): 4  Neck lateral flexion (C3): 3+    Left Shoulder     Planes of Motion   Abduction: 4-   External rotation at 0°: 4-     Right Shoulder     Planes of Motion   Abduction: 4-   External rotation at 0°: 4-     Left Elbow   Flexion: 4  Extension: 4    Right Elbow   Flexion: 4  Extension: 4    Left Wrist/Hand   Wrist extension: 4  Wrist flexion: 4  Thumb extension: 4    Right Wrist/Hand   Wrist extension: 4-  Wrist flexion: 4-  Thumb extension: 4    Tests   Cervical   Positive vertical compression and lumbar distraction test     Left   Positive Spurling's Test A and Spurling's Test B       Right Positive Spurling's Test A and Spurling's Test B  Left Shoulder   Positive ULTT3  Right Shoulder   Positive ULTT3  Lumbar   Positive vertical compression                 Precautions: none  Dx: lower c/s radiculopathy with an opening and postural correction preference, acute LBP with a stability preference - DA    Manuals 3/18            Graston/STM B UT, scalenes, suboccipitals             Seated L4-5 flexion SNAGs             Manual B radial nerve glides 1x5                                                   Mechanical traction 30 deg  15 lbs  2x5 min            Neuro Re-Ed             Supine chin tucks             Seated c/s retraction             Seated c/s retraction w/ OP             iso scap retraction             B TB ER             PPT             Supine DLS marches             bridges                                                    Ther Ex             clamshells                                                                                                        Ther Activity                                       Gait Training                                       Modalities

## 2021-04-14 NOTE — PROGRESS NOTES
Pt never returned to PT, no-showed 2 visits following IE  Discharge secondary to poor compliance, low motivation

## 2021-05-05 ENCOUNTER — TELEPHONE (OUTPATIENT)
Dept: NEUROLOGY | Facility: CLINIC | Age: 36
End: 2021-05-05

## 2021-05-13 NOTE — PROGRESS NOTES
[address] [facility]  PATIENT:  Jose R Mckeon  MRN:  [id number]  :  1985  DATE OF SERVICE:  2021  REFERRED BY: Dinh Liu PA-C  PMD: Diane Hernandez PA-C    Assessment/Plan:     Jose R Mckeon is a delightful 29 y o  female with a past medical history that includes Chronic headache, chronic shoulder pain, chronic neck pain, complains of total body pain (following with pain management), insomnia, BUE paresthesias, vitamin-D deficiency referred here for evaluation of headache  My initial evaluation 2021     Chronic daily headache  Possible Chronic migraine without aura without status migrainosus, not intractable  Scalp Allodynia  She reports a history of chronic pain and may areas for which she has not vomited pain management and is here to discuss chronic headaches  She reports family history of headaches or migraines in mom  She does not recall frequent headaches in the past up until around age 28 and subsequently has had basically daily headaches  Typically bilateral bioccipital pain and pressure with sensitivity to touch in that region as well as bitemporal pain sometimes  She denies aura reports some typical associated migrainous features without unilateral autonomic features  - as of 2021: chronic daily headache for years, severe 3-4 days a week     Workup:  - due to increased frequency and severity of headaches and migraines I recommend further evaluation with MRI brain with without contrast to rule out structural or treatable causes of symptoms     Preventative:  - we discussed headache hygiene and lifestyle factors that may improve headaches  -  Trial of amitriptyline gradual titration up to 50 mg nightly  Discussed proper use, possible side effects and risks    - Past/ failed/contraindicated: -   - future options:   Venlafaxine, gabapentin, topiramate, CGRP med, botox    Abortive:  - discussed not taking over-the-counter or prescription pain medications more than 3 days per week to prevent medication overuse/rebound headache  -   Since the pain is constant and daily will not add abortive at this time  -  She already has through other providers:  Methocarbamol, meloxicam  - future options:   Could consider Triptan, indomethacin trial, prochlorperazine, Toradol IM or p o , could consider trial for 5 days of Depakote or dexamethasone 4 prolonged migraine, ubrelvy, reyvow, nurtec    Patient instructions      Follow up with pain medicine for the other pains     Additional Testing:   Neurodiagnostic workup: MRI Brain ordered    Headache/migraine treatment:   Abortive medications (for immediate treatment of a headache): It is ok to take ibuprofen, acetaminophen or naproxen (Advil, Tylenol,  Aleve, Excedrin) if they help your headaches you should limit these to No more than 3 times a week to avoid medication overuse/rebound headaches  Prescription preventive medications for headaches/migraines   (to take every day to help prevent headaches - not to take at the time of headache):  [x] Amytriptyline start 10mg at bedtime for 1 week   Then 20 mg nightly for 1 week  Then 30 mg nightly for 1 week  Then 40 mg nightly for 1 week  Then 50 mg nightly     *Typically these types of medications take time untill you see the benefit, although some may see improvement in days, often it may take weeks, especially if the medication is being titrated up to a beneficial level  Please contact us if there are any concerns or questions regarding the medication  Lifestyle Recommendations:  [x] SLEEP - Maintain a regular sleep schedule: Adults need at least 7-8 hours of uninterrupted a night  Maintain good sleep hygiene:  Going to bed and waking up at consistent times, avoiding excessive daytime naps, avoiding caffeinated beverages in the evening, avoid excessive stimulation in the evening and generally using bed primarily for sleeping    One hour before bedtime would recommend turning lights down lower, decreasing your activity (may read quietly, listen to music at a low volume)  When you get into bed, should eliminate all technology (no texting, emailing, playing with your phone, iPad or tablet in bed)  [x] HYDRATION - Maintain good hydration  Drink  2L of fluid a day (4 typical small water bottles)  [x] DIET - Maintain good nutrition  In particular don't skip meals and try and eat healthy balanced meals regularly  [x] TRIGGERS - Look for other triggers and avoid them: Limit caffeine to 1-2 cups a day or less  Avoid dietary triggers that you have noticed bring on your headaches (this could include aged cheese, peanuts, MSG, aspartame and nitrates)  [x] EXERCISE - physical exercise as we all know is good for you in many ways, and not only is good for your heart, but also is beneficial for your mental health, cognitive health and  chronic pain/headaches  I would encourage at the least 5 days of physical exercise weekly for at least 30 minutes  Education and Follow-up  [x] Please call with any questions or concerns  Of course if any new concerning symptoms go to the emergency department  [x] Follow up 3-4 months with Paramjit Gorman     - As we discussed if there are no abnormalities on the brain MRI that need urgent intervention (very often there are incidental findings that may not mean anything significant and are better discussed in person), you will not necessarily receive a call from us, but feel free to call in to check on the status of the report (since not knowing can be anxiety provoking) and the nurses will let you know the result or make sure I have nothing to pass on regarding the results first   Ideally, all of this will be discussed in detail in the follow-up visit  CC:   We had the pleasure of evaluating Ricardo Swartz in neurological consultation today  Ricardo Swartz is a   right handed female who presents today for evaluation of headaches  History obtained from patient as well as available medical record review  History of Present Illness:   Current medical illnesses  or past medical history include   Chronic headache, chronic shoulder pain, chronic neck pain, complains of total body pain, insomnia, paresthesias, vitamin-D deficiency     She was referred for headaches   PCP referred her to Rheumatology and Pain Management for whole-body pain    Headaches started at what age? 28years old  How often do the headaches occur?   - as of 5/14/2021: chronic daily headache for years, severe 3-4 days a week   What time of the day do the headaches start? Always there, Afternoon worse  How long do the headaches last? All day  Are you ever headache free? No    Aura? without aura     Last eye exam: around April 2021 - found possible glaucoma     Where is your headache located and pain quality? Bilateral bioccipital pain   Pressure   sensative to touch   Less often bitemporal   What is the intensity of pain? Average: 5/10, worst 10/10  Associated symptoms:   [] Nausea       [] Vomiting         [] Problems with concentration  [x] Photophobia     [x]Phonophobia      [] Osmophobia  [x] Blurred vision   [x] Prefer quiet, dark room  [] Light-headed or dizzy     [] Tinnitus - just sometimes   [] Hands or feet tingle or feel numb/paresthesias      [] Ptosis      [] Facial droop  [x] Lacrimation - bilateral sometimes   [] Nasal congestion/rhinorrhea      Things that make the headache worse? No specific movements  Touching the scalp     Headache triggers:  Stress, more around periods, caffeine withdrawal     Have you seen someone else for headaches or pain? Yes, PCP  Have you had trigger point injection performed and how often? No  Have you had Botox injection performed and how often? No   Have you had epidural injections or transforaminal injections performed? No  Are you current pregnant or planning on getting pregnant?  Maybe, in years   Have you ever had any Brain imaging? no    What medications do you take or have you taken for your headaches? ABORTIVE:    OTC medications have been ineffective      methocarbamol 5 times a week helps more than meloxicam   meloxicam - 3-4 times a week for severe pain helps a little     PREVENTIVE:   -       Past/ failed/contraindicated:  -     Alternative therapies used in the past for headaches?  PT    LIFESTYLE  Sleep   - averages: sleeps well usually unless she has caffeine late    Water: tries 8 cups a day per day  Caffeine: 1 cup per day    Mood:   Denies history of anxiety or depression or other diagnosed mood disorder    The following portions of the patient's history were reviewed and updated as appropriate: allergies, current medications, past family history, past medical history, past social history, past surgical history and problem list     Pertinent family history:  Family history of headaches:  migraine headaches in mother  Any family history of aneurysms - No    Pertinent social history:  Work: cleaning  Education: 9th  From [place]   Lives with  and 2 kids 15, 15     Illicit Drugs: denies  Alcohol/tobacco: Denies tobacco use, alcohol intake: once a year    Past Medical History:     Past Medical History:   Diagnosis Date    Abnormal Pap smear of cervix     HPV (human papilloma virus) infection        Patient Active Problem List   Diagnosis    Cervical high risk HPV (human papillomavirus) test positive    Vitamin D deficiency    Breast pain in female    Vaginal cyst    Pelvic cramping    Chronic neck pain    Numbness and tingling of both upper extremities    Complaints of total body pain    Chronic tension-type headache, not intractable    Insomnia    Abnormality of left breast on screening mammogram    Bilateral shoulder pain       Medications:      Current Outpatient Medications   Medication Sig Dispense Refill    meloxicam (MOBIC) 7 5 mg tablet Take 1 tablet (7 5 mg total) by mouth 2 (two) times a day as needed for moderate pain 60 tablet 1    methocarbamol (ROBAXIN) 500 mg tablet Take 1 tablet (500 mg total) by mouth 3 (three) times a day as needed for muscle spasms (neck and head pain) 30 tablet 2    amitriptyline (ELAVIL) 10 mg tablet start 10mg at bedtime  Increase by 10mg each week until good effect on headaches/pain or reach 50mg daily 150 tablet 3     No current facility-administered medications for this visit           Allergies:    No Known Allergies    Family History:     Family History   Problem Relation Age of Onset    Hyperlipidemia Mother     Diabetes Father     Kidney disease Father         on Hemodialysis    No Known Problems Sister     No Known Problems Brother     No Known Problems Daughter     No Known Problems Son     No Known Problems Maternal Grandmother     No Known Problems Maternal Grandfather     Diabetes Paternal Grandfather     Kidney disease Paternal Grandfather     No Known Problems Sister     No Known Problems Maternal Aunt     No Known Problems Maternal Aunt     No Known Problems Maternal Aunt     No Known Problems Paternal Aunt        Social History:       Social History     Socioeconomic History    Marital status: /Civil Union     Spouse name: Not on file    Number of children: Not on file    Years of education: Not on file    Highest education level: Not on file   Occupational History    Not on file   Social Needs    Financial resource strain: Not hard at all   [employer] insecurity     Worry: Never true     Inability: Never true    Transportation needs     Medical: No     Non-medical: No   Tobacco Use    Smoking status: Never Smoker    Smokeless tobacco: Never Used   Substance and Sexual Activity    Alcohol use: Not Currently     Frequency: Monthly or less     Drinks per session: 1 or 2    Drug use: Never    Sexual activity: Yes     Partners: Male     Comment: Michael    Lifestyle    Physical activity     Days per week: 3 days Minutes per session: 30 min    Stress: Not at all   Relationships    Social connections     Talks on phone: More than three times a week     Gets together: More than three times a week     Attends [employer]ism service: Never     Active member of club or organization: No     Attends meetings of clubs or organizations: Never     Relationship status: Not on file    Intimate partner violence     Fear of current or ex partner: No     Emotionally abused: No     Physically abused: No     Forced sexual activity: No   Other Topics Concern    Not on file   Social History Narrative    Not on file         Objective:       Physical Exam:                                                                 Vitals:            Constitutional:    /76 (BP Location: Left arm, Patient Position: Sitting, Cuff Size: Standard)   Pulse 67   Ht 5' 5" (1 651 m)   Wt 75 3 kg (166 lb)   BMI 27 62 kg/m²   BP Readings from Last 3 Encounters:   05/14/21 119/76   02/23/21 129/84   02/22/21 113/75     Pulse Readings from Last 3 Encounters:   05/14/21 67   02/23/21 71   02/22/21 72         Well developed, well nourished, well groomed  No dysmorphic features  HEENT:  Normocephalic atraumatic  No meningismus  Oropharynx is clear and moist  No oral mucosal lesions  Chest:  Respirations regular and unlabored  Cardiovascular:  Regular rate, intact distal pulses  Distal extremities warm without palpable edema or tenderness, no observed significant swelling  Musculoskeletal:  Full range of motion  (see below under neurologic exam for evaluation of motor function and gait)   Skin:  warm and dry, not diaphoretic  No apparent birthmarks or stigmata of neurocutaneous disease  Psychiatric:  Normal behavior and appropriate affect        Neurological Examination:     Mental status/cognitive function:   Orientated to time, place and person  Recent and remote memory intact   Attention span and concentration as well as fund of knowledge are appropriate for age  Normal language and spontaneous speech  Cranial Nerves:  II-visual fields full  Fundi poorly visualized due to pupillary constriction  III, IV, VI-Pupils were equal, round, and reactive to light and accomodation  Extraocular movements were full and conjugate without nystagmus  V-facial sensation symmetric  VII-facial expression symmetric, intact forehead wrinkle, strong eye closure, symmetric smile    VIII-hearing grossly intact bilaterally   IX, X-palate elevation symmetric, no dysarthria  XI-shoulder shrug strength intact    XII-tongue protrusion midline  Motor Exam: symmetric bulk and tone throughout, no pronator drift  Power/strength 5/5 bilateral upper and lower extremities, no atrophy, fasciculations or abnormal movements noted  Sensory: grossly intact light touch in all extremities  Reflexes: brachioradialis 2+, biceps 2+, knee 2+, ankle 2+ bilaterally  No ankle clonus  Coordination: Finger nose finger intact bilaterally, no apparent dysmetria, ataxia or tremor noted  Gait: steady casual and tandem gait  Pertinent lab results:   12/22/2020 CMP and CBC unremarkable  B12 1111  TSH normal  02/03/2017 vitamin-D 18 3     EKG 06/22/2014: sinus rhythm with sinus arrhythmia rate 76,      Imaging:    no head imaging noted in system       Review of Systems:   ROS obtained by medical assistant Personally reviewed and updated if indicated  Review of Systems   Constitutional: Negative  Negative for appetite change and fever  HENT: Negative  Negative for hearing loss, tinnitus, trouble swallowing and voice change  Eyes: Negative  Negative for photophobia and pain  Respiratory: Negative  Negative for shortness of breath  Cardiovascular: Negative  Negative for palpitations  Gastrointestinal: Negative  Negative for nausea and vomiting  Endocrine: Negative  Negative for cold intolerance  Genitourinary: Negative  Negative for dysuria, frequency and urgency  Musculoskeletal: Positive for neck pain  Negative for myalgias  Skin: Negative  Negative for rash  Neurological: Positive for headaches (daily)  Negative for dizziness, tremors, seizures, syncope, facial asymmetry, speech difficulty, weakness, light-headedness and numbness  Hematological: Negative  Does not bruise/bleed easily  Psychiatric/Behavioral: Negative  Negative for confusion, hallucinations and sleep disturbance  I have spent 43 minutes with Patient today in which greater than 50% of this time was spent in counseling/coordination of care regarding Prognosis, Risks and benefits of tx options, Intructions for management, Patient education, Importance of tx compliance, Risk factor reductions and Impressions  I also spent 20 minutes non face to face for this patient the same day           Author:  Lake Morales MD 5/14/2021 2:55 PM

## 2021-05-14 ENCOUNTER — CONSULT (OUTPATIENT)
Dept: NEUROLOGY | Facility: CLINIC | Age: 36
End: 2021-05-14
Payer: COMMERCIAL

## 2021-05-14 VITALS
DIASTOLIC BLOOD PRESSURE: 76 MMHG | BODY MASS INDEX: 27.66 KG/M2 | HEART RATE: 67 BPM | WEIGHT: 166 LBS | SYSTOLIC BLOOD PRESSURE: 119 MMHG | HEIGHT: 65 IN

## 2021-05-14 DIAGNOSIS — R20.8 ALLODYNIA: ICD-10-CM

## 2021-05-14 DIAGNOSIS — G43.709 CHRONIC MIGRAINE WITHOUT AURA WITHOUT STATUS MIGRAINOSUS, NOT INTRACTABLE: ICD-10-CM

## 2021-05-14 DIAGNOSIS — R51.9 CHRONIC DAILY HEADACHE: Primary | ICD-10-CM

## 2021-05-14 PROCEDURE — 3008F BODY MASS INDEX DOCD: CPT | Performed by: PSYCHIATRY & NEUROLOGY

## 2021-05-14 PROCEDURE — 99205 OFFICE O/P NEW HI 60 MIN: CPT | Performed by: PSYCHIATRY & NEUROLOGY

## 2021-05-14 PROCEDURE — 1036F TOBACCO NON-USER: CPT | Performed by: PSYCHIATRY & NEUROLOGY

## 2021-05-14 RX ORDER — AMITRIPTYLINE HYDROCHLORIDE 10 MG/1
TABLET, FILM COATED ORAL
Qty: 150 TABLET | Refills: 3 | Status: SHIPPED | OUTPATIENT
Start: 2021-05-14

## 2021-05-14 NOTE — PROGRESS NOTES
Review of Systems   Constitutional: Negative  Negative for appetite change and fever  HENT: Negative  Negative for hearing loss, tinnitus, trouble swallowing and voice change  Eyes: Negative  Negative for photophobia and pain  Respiratory: Negative  Negative for shortness of breath  Cardiovascular: Negative  Negative for palpitations  Gastrointestinal: Negative  Negative for nausea and vomiting  Endocrine: Negative  Negative for cold intolerance  Genitourinary: Negative  Negative for dysuria, frequency and urgency  Musculoskeletal: Positive for neck pain  Negative for myalgias  Skin: Negative  Negative for rash  Neurological: Positive for headaches (daily)  Negative for dizziness, tremors, seizures, syncope, facial asymmetry, speech difficulty, weakness, light-headedness and numbness  Hematological: Negative  Does not bruise/bleed easily  Psychiatric/Behavioral: Negative  Negative for confusion, hallucinations and sleep disturbance

## 2021-05-14 NOTE — PATIENT INSTRUCTIONS
Follow up with pain medicine for the other pains     Additional Testing:   Neurodiagnostic workup: MRI Brain ordered    Headache/migraine treatment:   Abortive medications (for immediate treatment of a headache): It is ok to take ibuprofen, acetaminophen or naproxen (Advil, Tylenol,  Aleve, Excedrin) if they help your headaches you should limit these to No more than 3 times a week to avoid medication overuse/rebound headaches  Prescription preventive medications for headaches/migraines   (to take every day to help prevent headaches - not to take at the time of headache):  [x] Amytriptyline start 10mg at bedtime for 1 week   Then 20 mg nightly for 1 week  Then 30 mg nightly for 1 week  Then 40 mg nightly for 1 week  Then 50 mg nightly     *Typically these types of medications take time untill you see the benefit, although some may see improvement in days, often it may take weeks, especially if the medication is being titrated up to a beneficial level  Please contact us if there are any concerns or questions regarding the medication  Lifestyle Recommendations:  [x] SLEEP - Maintain a regular sleep schedule: Adults need at least 7-8 hours of uninterrupted a night  Maintain good sleep hygiene:  Going to bed and waking up at consistent times, avoiding excessive daytime naps, avoiding caffeinated beverages in the evening, avoid excessive stimulation in the evening and generally using bed primarily for sleeping  One hour before bedtime would recommend turning lights down lower, decreasing your activity (may read quietly, listen to music at a low volume)  When you get into bed, should eliminate all technology (no texting, emailing, playing with your phone, iPad or tablet in bed)  [x] HYDRATION - Maintain good hydration  Drink  2L of fluid a day (4 typical small water bottles)  [x] DIET - Maintain good nutrition  In particular don't skip meals and try and eat healthy balanced meals regularly    [x] TRIGGERS - Look for other triggers and avoid them: Limit caffeine to 1-2 cups a day or less  Avoid dietary triggers that you have noticed bring on your headaches (this could include aged cheese, peanuts, MSG, aspartame and nitrates)  [x] EXERCISE - physical exercise as we all know is good for you in many ways, and not only is good for your heart, but also is beneficial for your mental health, cognitive health and  chronic pain/headaches  I would encourage at the least 5 days of physical exercise weekly for at least 30 minutes  Education and Follow-up  [x] Please call with any questions or concerns  Of course if any new concerning symptoms go to the emergency department  [x] Follow up 3-4 months with Daniela Urena     - As we discussed if there are no abnormalities on the brain MRI that need urgent intervention (very often there are incidental findings that may not mean anything significant and are better discussed in person), you will not necessarily receive a call from us, but feel free to call in to check on the status of the report (since not knowing can be anxiety provoking) and the nurses will let you know the result or make sure I have nothing to pass on regarding the results first   Ideally, all of this will be discussed in detail in the follow-up visit

## 2021-07-30 ENCOUNTER — TELEPHONE (OUTPATIENT)
Dept: INTERNAL MEDICINE CLINIC | Facility: CLINIC | Age: 36
End: 2021-07-30

## 2021-07-30 NOTE — TELEPHONE ENCOUNTER
Zane Maldonado from [address] [place] [address] called in to let PCP know that for the ongoing health concerns that this patient is having she cannot be treated by Rheumatology in specific with Vikas Plants, MD  Is their an alternative for this patient?  Please advise

## 2021-08-02 NOTE — TELEPHONE ENCOUNTER
Im not sure what this message means  Does it mean that [facility] does not deal with patients sxs and needs to look into seeing a different rheumatologist   Or does it mean she had rheumatology workup at [facility] and they dont think it is rheum related? It would also be helpful to have rheumatology note faxed to us and scanned in pt chart to refer to - nothing in media as of yet  Once I know more info, I can make decision  Pt may need to be re-evaluated by me depending

## 2021-08-03 NOTE — TELEPHONE ENCOUNTER
I called and spoke to Narayan Mejia from [facility] and per Narayan Mejia she was advising us that the patient No showed in the beginning of the year and when they called to reschedule the appointment patient did not want to reschedule with Dr Roberto Arias  This is just an fyi

## 2021-08-04 NOTE — TELEPHONE ENCOUNTER
Just received a phone call from [facility] stating she received another referral for pt to be seen  [facility] stated Dr Shai Mccoy will not see patient to stop sending referrals

## 2021-08-04 NOTE — TELEPHONE ENCOUNTER
I called and spoke with rheumatology office at [facility] [place] specifically and they were not aware of the situation  I was able to speak directly with Janiya Cameronangie and she was not aware of her calling today however she did state they received a referral from me on 7/28/21 and again today, 8/4/21  I am not exactly sure where the referrals are coming from as I only have 1 referral in the computer system from January of 2021 and I already reviewed the previous message from 2 days ago that patient no showed for her 1st appointment and declined any kind of rescheduled appointment  I advised that they discontinue the referrals as again I am unsure why they would receive any referrals from us and reassured her I only had 1 referral in patient's chart here from January and if she continues receiving referrals to please contact our office and speak with clerical staff about this to clarify

## 2021-09-30 ENCOUNTER — OFFICE VISIT (OUTPATIENT)
Dept: INTERNAL MEDICINE CLINIC | Facility: CLINIC | Age: 36
End: 2021-09-30

## 2021-09-30 ENCOUNTER — TELEPHONE (OUTPATIENT)
Dept: INTERNAL MEDICINE CLINIC | Facility: CLINIC | Age: 36
End: 2021-09-30

## 2021-09-30 ENCOUNTER — TELEPHONE (OUTPATIENT)
Dept: OTHER | Facility: OTHER | Age: 36
End: 2021-09-30

## 2021-09-30 VITALS
SYSTOLIC BLOOD PRESSURE: 114 MMHG | DIASTOLIC BLOOD PRESSURE: 79 MMHG | BODY MASS INDEX: 28.72 KG/M2 | TEMPERATURE: 98.3 F | OXYGEN SATURATION: 98 % | HEIGHT: 65 IN | WEIGHT: 172.4 LBS | HEART RATE: 69 BPM

## 2021-09-30 DIAGNOSIS — Z13.1 SCREENING FOR DIABETES MELLITUS: ICD-10-CM

## 2021-09-30 DIAGNOSIS — Z13.0 SCREENING FOR DEFICIENCY ANEMIA: ICD-10-CM

## 2021-09-30 DIAGNOSIS — E55.9 VITAMIN D DEFICIENCY: ICD-10-CM

## 2021-09-30 DIAGNOSIS — Z13.220 SCREENING, LIPID: ICD-10-CM

## 2021-09-30 DIAGNOSIS — Z00.00 ROUTINE ADULT HEALTH MAINTENANCE: Primary | ICD-10-CM

## 2021-09-30 DIAGNOSIS — E66.3 OVERWEIGHT WITH BODY MASS INDEX (BMI) OF 28 TO 28.9 IN ADULT: ICD-10-CM

## 2021-09-30 PROCEDURE — 99395 PREV VISIT EST AGE 18-39: CPT | Performed by: PHYSICIAN ASSISTANT

## 2021-09-30 NOTE — TELEPHONE ENCOUNTER
Patient is calling because she stated she has a referral from her PCP, she would like to become a patient within practice

## 2021-09-30 NOTE — PROGRESS NOTES
Assessment/Plan:      Diagnoses and all orders for this visit:    Routine adult health maintenance    Overweight with body mass index (BMI) of 28 to 28 9 in adult  -     Ambulatory referral to Weight Management; Future  -     TSH, 3rd generation with Free T4 reflex; Future    Vitamin D deficiency  -     Vitamin D 25 hydroxy; Future    Screening for deficiency anemia  -     CBC and differential; Future    Screening for diabetes mellitus  -     Comprehensive metabolic panel; Future    Screening, lipid  -     Lipid panel; Future      patient is a very pleasant 21-year-old female presenting today scheduled for 's permit physical/annual physical     Overall with patient's assessment there does not appear to be any significant health problems that would affect her ability to safely drive a vehicle  The form was completed and signed and copied to scanned into her chart  Patient left with form  Age-appropriate education regarding screenings and prevention were addressed with patient today  Patient declines influenza vaccine at present but states that she will consider  COVID vaccine has been previously deferred however we had a discussion today regarding risks and benefits, potential side effects and true versus false information regarding the vaccine itself  Patient states that she is encouraged to get it however her  does not want her to get it but feels that she will  She can certainly get it through [facility] during walk-in clinic or she can get it through the local pharmacy  She is aware that if she decides to get influenza vaccine I would recommend considering completing COVID vaccine series 1st and would recommend waiting at least 2 weeks before getting another vaccine as to avoid affecting immune response  Patient agrees and understands  Patient is interested in routine screening blood work to include CBC, CMP, lipids, vitamin-D as well as TSH    She will get this done at her earliest convenience and we will call with results  I did briefly discussed with patient her BMI currently 28 69  She states that she feels that she eats fairly healthy and exercises a decent amount and has difficulty losing weight  I did discuss options with her including referral to nonsurgical weight management and she agrees  Information given to schedule appointment at 1 of the weight management offices in [facility] or [address] [place] [address]  I did encourage patient to schedule an appointment with her gyn as she still has reportedly some abnormal vaginal discharge and intermittent left lower pelvic discomfort  It appears she place to call in July to [facility] gynecology team but then patient states that she thinks she did not show for the appointment as I do not see any appointment documented in her chart review  It appears she last saw them in February and states that her workup was unremarkable  Patient will schedule  I did encourage patient to continue pursuing her back and neck pain and headaches with follow-ups with Neurology, especially if she cannot complete her MRI  I also encouraged her to continue physical therapy exercises home to maintain her back pain and certainly can schedule a separate follow-up visit to discuss in more detail  Otherwise patient will follow-up in 1 year for annual physical     Chief Complaint   Patient presents with    Physical Exam     physical for drivers permit           Subjective:     Patient ID: Davy Carter is a 29 y o  female     35y/o female here today for drivers permit and annual physical     Patient does report today back pain as well as neck pain and headaches for which she was managed by physical therapy as well as Neurology  She states that they ordered an MRI but was not fully covered so she decided not to follow through  She states she is able to manage overall  Last dental cleaning > 1 5 years ago    Last eye exam  - about 4 months ago     She does not take any vitamins  She tries to eat healthy, she eats 2 x a day  She eats grilled meats/chicken, veggies, salad  States sometimes will eat bad but fast food maybe 1 x a month  She mainly drinks water, rare soda or juice  2-3 x a week, walking, aerobic or weight  She is UTD with PAP testing - sees [facility]  States had some discharge and pelvic pain, was told no infection  states had pelvic US  states sometimes darker, sometimes yellow  Denies urinary sxs or worsening pelvic pain  LMP: approx 9/19/21  Monthly  She has same sexual partner x 4 years  She lives in house with  and 2 children  She feels safe at home  She feels safe in real;tinship with   wears seatbelt in the car  Smoke alarms working  Denies tobacco  Denies ETOH (rare)  Denies drugs  Review of Systems   Constitutional: Negative  HENT: Negative  Eyes: Negative  Respiratory: Negative  Cardiovascular: Negative  Gastrointestinal: Negative  Genitourinary: Negative  Musculoskeletal:        As in HPI   Skin: Negative  Neurological:        As in HPI   Psychiatric/Behavioral: Negative  The following portions of the patient's history were reviewed and updated as appropriate: allergies, current medications, past family history, past medical history, past social history, past surgical history and problem list       Objective:     Physical Exam  Vitals reviewed  Constitutional:       General: She is not in acute distress  Appearance: Normal appearance  She is not ill-appearing or toxic-appearing  Comments: Overweight BMI 28 69   HENT:      Head: Normocephalic and atraumatic  Right Ear: Tympanic membrane, ear canal and external ear normal       Left Ear: Tympanic membrane, ear canal and external ear normal       Nose: Nose normal       Mouth/Throat:      Pharynx: Oropharynx is clear  Eyes:      General:         Right eye: No discharge           Left eye: No discharge  Extraocular Movements: Extraocular movements intact  Conjunctiva/sclera: Conjunctivae normal       Pupils: Pupils are equal, round, and reactive to light  Neck:      Thyroid: No thyroid tenderness  Vascular: Normal carotid pulses  No carotid bruit  Trachea: Phonation normal    Cardiovascular:      Rate and Rhythm: Normal rate and regular rhythm  Pulses: Normal pulses  Heart sounds: Normal heart sounds  Pulmonary:      Effort: Pulmonary effort is normal       Breath sounds: Normal breath sounds  Abdominal:      General: Abdomen is flat  Bowel sounds are normal       Palpations: Abdomen is soft  Tenderness: There is no abdominal tenderness  Musculoskeletal:      Cervical back: Neck supple  Right lower leg: No edema  Left lower leg: No edema  Comments: Details musculoskeletal exam not performed today however patient observed with moving on and off exam table as well as walking from the exam room to discharge and is moving normally without any obvious evidence of pain or restriction  Lymphadenopathy:      Head:      Right side of head: No submandibular or tonsillar adenopathy  Left side of head: No submandibular or tonsillar adenopathy  Neurological:      Mental Status: She is alert and oriented to person, place, and time  Motor: Motor function is intact  Coordination: Coordination is intact  Gait: Gait is intact  Deep Tendon Reflexes:      Reflex Scores:       Brachioradialis reflexes are 2+ on the right side and 2+ on the left side  Patellar reflexes are 2+ on the right side and 2+ on the left side  Psychiatric:         Mood and Affect: Mood normal          Speech: Speech normal          Behavior: Behavior normal  Behavior is cooperative           Vitals:    09/30/21 1521   BP: 114/79   BP Location: Left arm   Patient Position: Sitting   Cuff Size: Standard   Pulse: 69   Temp: 98 3 °F (36 8 °C)   TempSrc: Temporal   SpO2: 98%   Weight: 78 2 kg (172 lb 6 4 oz)   Height: 5' 5" (1 651 m)     PHQ-9 Depression Screening    PHQ-9:   Frequency of the following problems over the past two weeks:      Little interest or pleasure in doing things: 0 - not at all  Feeling down, depressed, or hopeless: 0 - not at all  PHQ-2 Score: 0         BMI Counseling: Body mass index is 28 69 kg/m²  The BMI is above normal  Nutrition recommendations include reducing portion sizes, decreasing overall calorie intake, 3-5 servings of fruits/vegetables daily, reducing fast food intake, consuming healthier snacks, decreasing soda and/or juice intake, moderation in carbohydrate intake, increasing intake of lean protein, reducing intake of saturated fat and trans fat and reducing intake of cholesterol  Exercise recommendations include exercising 3-5 times per week  Pt referred to non-surgical [facility]

## 2021-10-01 NOTE — TELEPHONE ENCOUNTER
Spoke to pt; pt is not interested in surgical weight loss at this time  Referred patient to the non surgical side

## 2022-08-25 ENCOUNTER — OFFICE VISIT (OUTPATIENT)
Dept: INTERNAL MEDICINE CLINIC | Facility: CLINIC | Age: 37
End: 2022-08-25

## 2022-08-25 ENCOUNTER — APPOINTMENT (OUTPATIENT)
Dept: LAB | Facility: CLINIC | Age: 37
End: 2022-08-25
Payer: COMMERCIAL

## 2022-08-25 VITALS
HEIGHT: 65 IN | TEMPERATURE: 98.1 F | HEART RATE: 69 BPM | WEIGHT: 175 LBS | DIASTOLIC BLOOD PRESSURE: 78 MMHG | BODY MASS INDEX: 29.16 KG/M2 | SYSTOLIC BLOOD PRESSURE: 118 MMHG

## 2022-08-25 DIAGNOSIS — G44.229 CHRONIC TENSION-TYPE HEADACHE, NOT INTRACTABLE: Primary | ICD-10-CM

## 2022-08-25 DIAGNOSIS — E55.9 VITAMIN D DEFICIENCY: ICD-10-CM

## 2022-08-25 DIAGNOSIS — G89.29 CHRONIC PELVIC PAIN IN FEMALE: ICD-10-CM

## 2022-08-25 DIAGNOSIS — Z13.220 SCREENING, LIPID: ICD-10-CM

## 2022-08-25 DIAGNOSIS — L72.9 SUBCUTANEOUS CYST: ICD-10-CM

## 2022-08-25 DIAGNOSIS — Z13.1 SCREENING FOR DIABETES MELLITUS: ICD-10-CM

## 2022-08-25 DIAGNOSIS — R10.2 CHRONIC PELVIC PAIN IN FEMALE: ICD-10-CM

## 2022-08-25 DIAGNOSIS — Z13.0 SCREENING FOR DEFICIENCY ANEMIA: ICD-10-CM

## 2022-08-25 DIAGNOSIS — E66.3 OVERWEIGHT WITH BODY MASS INDEX (BMI) OF 28 TO 28.9 IN ADULT: ICD-10-CM

## 2022-08-25 LAB
25(OH)D3 SERPL-MCNC: 56.4 NG/ML (ref 30–100)
ALBUMIN SERPL BCP-MCNC: 3.9 G/DL (ref 3.5–5)
ALP SERPL-CCNC: 90 U/L (ref 46–116)
ALT SERPL W P-5'-P-CCNC: 18 U/L (ref 12–78)
ANION GAP SERPL CALCULATED.3IONS-SCNC: 7 MMOL/L (ref 4–13)
AST SERPL W P-5'-P-CCNC: 20 U/L (ref 5–45)
BASOPHILS # BLD AUTO: 0.11 THOUSANDS/ΜL (ref 0–0.1)
BASOPHILS NFR BLD AUTO: 1 % (ref 0–1)
BILIRUB SERPL-MCNC: 0.27 MG/DL (ref 0.2–1)
BUN SERPL-MCNC: 10 MG/DL (ref 5–25)
CALCIUM SERPL-MCNC: 9 MG/DL (ref 8.3–10.1)
CHLORIDE SERPL-SCNC: 107 MMOL/L (ref 96–108)
CHOLEST SERPL-MCNC: 180 MG/DL
CO2 SERPL-SCNC: 25 MMOL/L (ref 21–32)
CREAT SERPL-MCNC: 1.02 MG/DL (ref 0.6–1.3)
EOSINOPHIL # BLD AUTO: 0.2 THOUSAND/ΜL (ref 0–0.61)
EOSINOPHIL NFR BLD AUTO: 3 % (ref 0–6)
ERYTHROCYTE [DISTWIDTH] IN BLOOD BY AUTOMATED COUNT: 12.6 % (ref 11.6–15.1)
GFR SERPL CREATININE-BSD FRML MDRD: 70 ML/MIN/1.73SQ M
GLUCOSE P FAST SERPL-MCNC: 98 MG/DL (ref 65–99)
HCT VFR BLD AUTO: 45.2 % (ref 34.8–46.1)
HDLC SERPL-MCNC: 37 MG/DL
HGB BLD-MCNC: 14.5 G/DL (ref 11.5–15.4)
IMM GRANULOCYTES # BLD AUTO: 0.04 THOUSAND/UL (ref 0–0.2)
IMM GRANULOCYTES NFR BLD AUTO: 1 % (ref 0–2)
LDLC SERPL CALC-MCNC: 116 MG/DL (ref 0–100)
LYMPHOCYTES # BLD AUTO: 2.02 THOUSANDS/ΜL (ref 0.6–4.47)
LYMPHOCYTES NFR BLD AUTO: 26 % (ref 14–44)
MCH RBC QN AUTO: 29.7 PG (ref 26.8–34.3)
MCHC RBC AUTO-ENTMCNC: 32.1 G/DL (ref 31.4–37.4)
MCV RBC AUTO: 93 FL (ref 82–98)
MONOCYTES # BLD AUTO: 0.73 THOUSAND/ΜL (ref 0.17–1.22)
MONOCYTES NFR BLD AUTO: 10 % (ref 4–12)
NEUTROPHILS # BLD AUTO: 4.6 THOUSANDS/ΜL (ref 1.85–7.62)
NEUTS SEG NFR BLD AUTO: 59 % (ref 43–75)
NONHDLC SERPL-MCNC: 143 MG/DL
NRBC BLD AUTO-RTO: 0 /100 WBCS
PLATELET # BLD AUTO: 190 THOUSANDS/UL (ref 149–390)
PMV BLD AUTO: 13 FL (ref 8.9–12.7)
POTASSIUM SERPL-SCNC: 4 MMOL/L (ref 3.5–5.3)
PROT SERPL-MCNC: 7.9 G/DL (ref 6.4–8.4)
RBC # BLD AUTO: 4.88 MILLION/UL (ref 3.81–5.12)
SL AMB  POCT GLUCOSE, UA: NEGATIVE
SL AMB LEUKOCYTE ESTERASE,UA: NEGATIVE
SL AMB POCT BILIRUBIN,UA: NEGATIVE
SL AMB POCT BLOOD,UA: NEGATIVE
SL AMB POCT CLARITY,UA: CLEAR
SL AMB POCT COLOR,UA: YELLOW
SL AMB POCT KETONES,UA: NORMAL
SL AMB POCT NITRITE,UA: NEGATIVE
SL AMB POCT PH,UA: 6
SL AMB POCT SPECIFIC GRAVITY,UA: 1.02
SL AMB POCT URINE PROTEIN: NEGATIVE
SL AMB POCT UROBILINOGEN: 0.2
SODIUM SERPL-SCNC: 139 MMOL/L (ref 135–147)
TRIGL SERPL-MCNC: 136 MG/DL
TSH SERPL DL<=0.05 MIU/L-ACNC: 2.58 UIU/ML (ref 0.45–4.5)
WBC # BLD AUTO: 7.7 THOUSAND/UL (ref 4.31–10.16)

## 2022-08-25 PROCEDURE — 3725F SCREEN DEPRESSION PERFORMED: CPT | Performed by: INTERNAL MEDICINE

## 2022-08-25 PROCEDURE — 84443 ASSAY THYROID STIM HORMONE: CPT

## 2022-08-25 PROCEDURE — 80061 LIPID PANEL: CPT

## 2022-08-25 PROCEDURE — 80053 COMPREHEN METABOLIC PANEL: CPT

## 2022-08-25 PROCEDURE — 82306 VITAMIN D 25 HYDROXY: CPT

## 2022-08-25 PROCEDURE — 81002 URINALYSIS NONAUTO W/O SCOPE: CPT | Performed by: INTERNAL MEDICINE

## 2022-08-25 PROCEDURE — 85025 COMPLETE CBC W/AUTO DIFF WBC: CPT

## 2022-08-25 PROCEDURE — 36415 COLL VENOUS BLD VENIPUNCTURE: CPT

## 2022-08-25 PROCEDURE — 99213 OFFICE O/P EST LOW 20 MIN: CPT | Performed by: INTERNAL MEDICINE

## 2022-08-25 RX ORDER — ACETAMINOPHEN, ASPIRIN AND CAFFEINE 250; 250; 65 MG/1; MG/1; MG/1
1 TABLET, FILM COATED ORAL EVERY 6 HOURS PRN
Qty: 30 TABLET | Refills: 1 | Status: SHIPPED | OUTPATIENT
Start: 2022-08-25

## 2022-08-25 RX ORDER — LEVOTHYROXINE SODIUM 0.05 MG/1
50 TABLET ORAL EVERY MORNING
COMMUNITY
Start: 2022-08-18

## 2022-08-25 NOTE — PROGRESS NOTES
Clinic Visit Note  Rani Castro 40 y o  female   MRN: [id number]    Assessment and Plan      Diagnoses and all orders for this visit:    Chronic tension-type headache, not intractable  Patient reports chronic headaches for about the past 6 months  Band like in nature  Also with some tension in her neck  Likely chronic tension-type headaches  Has been on amitriptyline before, patient states that she has not aware of what it was for  At this time, will advise the patient to use NSAIDs, will send Excedrin to the patient's pharmacy  If the patient does not have relief, then will trial TCAs  -     aspirin-acetaminophen-caffeine (EXCEDRIN MIGRAINE) 250-250-65 MG per tablet; Take 1 tablet by mouth every 6 (six) hours as needed for headaches    Subcutaneous cyst  Patient with an enlarging cyst on her posterior neck for the past 6 months  States that has been growing in size  Nontender to touch  Will send to Dermatology for possible excision and drainage   -     Ambulatory Referral to Dermatology; Future    Chronic pelvic pain in female  Patient has had chronic pelvic pain for years  Follows with [facility]  Was recently worked up for infectious causes it in June  Results were unremarkable  Patient denies any GI symptoms  Does report some dysuria currently  No discharge  Will investigate with a urine dip in the office  Patient encouraged to reestablish with OBGYN and pelvic pain clinic  Patient currently being followed by for fertility clinic, her and her partner are actively trying to get pregnant  Health Maintenance:  PHQ-2/9 Depression Screening    Little interest or pleasure in doing things: 0 - not at all  Feeling down, depressed, or hopeless: 0 - not at all  PHQ-2 Score: 0  PHQ-2 Interpretation: Negative depression screen      The ASCVD Risk score (Subha Peguero et al , 2013) failed to calculate for the following reasons:     The 2013 ASCVD risk score is only valid for ages 36 to 78  Lab Results   Component Value Date    HGBA1C 5 1 12/22/2020      Lab Results   Component Value Date    HEPCAB Non-reactive 02/03/2017      Most Recent Immunizations   Administered Date(s) Administered    Tdap 10/18/2017    Not on file Not on file No components found for: HIV1X2     HIV/HCV negative  Follows with OB/GYN  Lipids pending  TSH pending  Vitamin-D pending  CMP pending  CBC pending    Schedule a follow-up appointment in 6 months  Chief Complaint: Lesion on posterior neck  Subjective     History of Present Illness:  Patient is a 40 y o  female with a PMHx of chronic pelvic pain, vitamin D deficiency, and hypothyroidism that presents today for concerns about a lesion on her posterior neck  She noticed it 6 months ago, it has been growing in size  It is non tender  The patient is also stating that she has headaches  Unsure if they are related  States that they [place] her head in a band-like fashion  Has been happening for months as well  Improves slightly with tylenol  In addition, the patient has chronic pelvic pain  She follows with OB/GYN  Recent infectious work up in June was negative  Patient is still to complete TVUS  Was referred to the pelvic pain clinic  Health maintenance as above  Patient has multiple labs to follow-up on    Review of Systems   Constitutional: Negative for chills and fatigue  HENT: Negative for congestion, rhinorrhea, sinus pressure and sinus pain  Eyes: Negative for pain  Respiratory: Negative for apnea, cough and shortness of breath  Cardiovascular: Negative for chest pain and leg swelling  Gastrointestinal: Negative for abdominal distention, abdominal pain, diarrhea, nausea and vomiting  Genitourinary: Positive for dyspareunia and dysuria  Musculoskeletal: Negative for arthralgias and myalgias  Skin: Positive for wound  Negative for rash  Neurological: Positive for headaches  Psychiatric/Behavioral: Negative for agitation and confusion  Current Outpatient Medications:     levothyroxine 50 mcg tablet, Take 50 mcg by mouth every morning, Disp: , Rfl:   Past Medical History:   Diagnosis Date    Abnormal Pap smear of cervix     HPV (human papilloma virus) infection      History reviewed  No pertinent surgical history  Social History     Socioeconomic History    Marital status: /Civil Union     Spouse name: Not on file    Number of children: Not on file    Years of education: Not on file    Highest education level: Not on file   Occupational History    Not on file   Tobacco Use    Smoking status: Never Smoker    Smokeless tobacco: Never Used   Vaping Use    Vaping Use: Never used   Substance and Sexual Activity    Alcohol use: Not Currently    Drug use: Never    Sexual activity: Yes     Partners: Male     Comment: Kyle Dutta    Other Topics Concern    Not on file   Social History Narrative    Not on file     Social Determinants of Health     Financial Resource Strain: Low Risk     Difficulty of Paying Living Expenses: Not hard at all   Food Insecurity: No Food Insecurity    Worried About Running Out of Food in the Last Year: Never true    Flavia of Food in the Last Year: Never true   Transportation Needs: No Transportation Needs    Lack of Transportation (Medical): No    Lack of Transportation (Non-Medical):  No   Physical Activity: Not on file   Stress: Not on file   Social Connections: Not on file   Intimate Partner Violence: Not on file   Housing Stability: Not on file     Family History   Problem Relation Age of Onset    Hyperlipidemia Mother     Diabetes Father     Kidney disease Father         on Hemodialysis    No Known Problems Sister     No Known Problems Brother     No Known Problems Daughter     No Known Problems Son     No Known Problems Maternal Grandmother     No Known Problems Maternal Grandfather     Diabetes Paternal Grandfather     Kidney disease Paternal Grandfather     No Known Problems Sister     No Known Problems Maternal Aunt     No Known Problems Maternal Aunt     No Known Problems Maternal Aunt     No Known Problems Paternal Aunt      No Known Allergies    Objective     Vitals:    08/25/22 0753   BP: 118/78   BP Location: Right arm   Patient Position: Sitting   Cuff Size: Adult   Pulse: 69   Temp: 98 1 °F (36 7 °C)   TempSrc: Temporal   Weight: 79 4 kg (175 lb)   Height: 5' 5" (1 651 m)       Physical exam:     Physical Exam  Vitals reviewed  HENT:      Head: Normocephalic and atraumatic  Right Ear: External ear normal       Left Ear: External ear normal       Nose: Nose normal       Mouth/Throat:      Mouth: Mucous membranes are moist       Pharynx: Oropharynx is clear  Eyes:      Extraocular Movements: Extraocular movements intact  Pupils: Pupils are equal, round, and reactive to light  Cardiovascular:      Rate and Rhythm: Normal rate and regular rhythm  Pulses: Normal pulses  Heart sounds: Normal heart sounds  Pulmonary:      Effort: Pulmonary effort is normal       Breath sounds: Normal breath sounds  Abdominal:      General: Abdomen is flat  Bowel sounds are normal  There is no distension  Tenderness: There is no abdominal tenderness  Musculoskeletal:      Right lower leg: No edema  Left lower leg: No edema  Skin:     Capillary Refill: Capillary refill takes less than 2 seconds  Findings: Lesion present  Neurological:      General: No focal deficit present  Mental Status: She is alert and oriented to person, place, and time  Psychiatric:         Mood and Affect: Mood normal          Behavior: Behavior normal            ==  PLEASE NOTE:  This encounter was completed utilizing the [employer] Speech Voice Recognition Software   Grammatical errors, random word insertions, pronoun errors and incomplete sentences are occasional consequences of the system due to software limitations, ambient noise and hardware issues  These may be missed by proof reading prior to affixing electronic signature  Any questions or concerns about the content, text or information contained within the body of this dictation should be directly addressed to the physician for clarification  Please do not hesitate to call me directly if you have any any questions or concerns      Rc Romero DO, [address]  PGY-3, Internal Medicine  [facility] Gonzales

## 2022-08-26 ENCOUNTER — TELEPHONE (OUTPATIENT)
Dept: INTERNAL MEDICINE CLINIC | Facility: CLINIC | Age: 37
End: 2022-08-26

## 2022-08-26 NOTE — TELEPHONE ENCOUNTER
I spoke with patient already earlier in the day (see lab) and made her aware of results  I called her again and advised her the thyroid testing is all normal  She did not speak to the doctor about any pressure in her neck at her appt yesterday so I advised her she can review at her next appt or she can schedule another one to discuss this neck   She said she will wait

## 2022-08-26 NOTE — TELEPHONE ENCOUNTER
levothyroxine 50 mcg tablet         Patient called wants to know if thyroid levels are normal, should she continue to take the levothyroxine? Patient states she feels pressure in her thyroid/neck area      Please check into this and call the patient to advise where does the pressure come from

## 2022-09-20 ENCOUNTER — HOSPITAL ENCOUNTER (EMERGENCY)
Facility: HOSPITAL | Age: 37
Discharge: HOME | End: 2022-09-21
Attending: EMERGENCY MEDICINE
Payer: COMMERCIAL

## 2022-09-20 DIAGNOSIS — O20.0 THREATENED MISCARRIAGE: Primary | ICD-10-CM

## 2022-09-20 PROBLEM — E05.90 HYPERTHYROIDISM: Status: ACTIVE | Noted: 2022-09-20

## 2022-09-20 LAB
BASOPHILS # BLD AUTO: 0.11 THOUSANDS/ΜL (ref 0–0.1)
BASOPHILS NFR BLD AUTO: 1 % (ref 0–1)
BILIRUB UR QL STRIP: NEGATIVE
CLARITY UR: CLEAR
COLOR UR: YELLOW
EOSINOPHIL # BLD AUTO: 0.28 THOUSAND/ΜL (ref 0–0.61)
EOSINOPHIL NFR BLD AUTO: 3 % (ref 0–6)
ERYTHROCYTE [DISTWIDTH] IN BLOOD BY AUTOMATED COUNT: 12.9 % (ref 11.6–15.1)
GLUCOSE UR STRIP-MCNC: NEGATIVE MG/DL
HCT VFR BLD AUTO: 40.1 % (ref 34.8–46.1)
HGB BLD-MCNC: 13.3 G/DL (ref 11.5–15.4)
HGB UR QL STRIP.AUTO: ABNORMAL
IMM GRANULOCYTES # BLD AUTO: 0.06 THOUSAND/UL (ref 0–0.2)
IMM GRANULOCYTES NFR BLD AUTO: 1 % (ref 0–2)
KETONES UR STRIP-MCNC: NEGATIVE MG/DL
LEUKOCYTE ESTERASE UR QL STRIP: NEGATIVE
LYMPHOCYTES # BLD AUTO: 2.52 THOUSANDS/ΜL (ref 0.6–4.47)
LYMPHOCYTES NFR BLD AUTO: 24 % (ref 14–44)
MCH RBC QN AUTO: 30.4 PG (ref 26.8–34.3)
MCHC RBC AUTO-ENTMCNC: 33.2 G/DL (ref 31.4–37.4)
MCV RBC AUTO: 92 FL (ref 82–98)
MONOCYTES # BLD AUTO: 1.19 THOUSAND/ΜL (ref 0.17–1.22)
MONOCYTES NFR BLD AUTO: 12 % (ref 4–12)
NEUTROPHILS # BLD AUTO: 6.21 THOUSANDS/ΜL (ref 1.85–7.62)
NEUTS SEG NFR BLD AUTO: 59 % (ref 43–75)
NITRITE UR QL STRIP: NEGATIVE
NRBC BLD AUTO-RTO: 0 /100 WBCS
PH UR STRIP.AUTO: 6 [PH] (ref 4.5–8)
PLATELET # BLD AUTO: 170 THOUSANDS/UL (ref 149–390)
PMV BLD AUTO: 12.8 FL (ref 8.9–12.7)
PROT UR STRIP-MCNC: NEGATIVE MG/DL
RBC # BLD AUTO: 4.38 MILLION/UL (ref 3.81–5.12)
SP GR UR STRIP.AUTO: 1.01 (ref 1–1.03)
UROBILINOGEN UR QL STRIP.AUTO: 0.2 E.U./DL
WBC # BLD AUTO: 10.37 THOUSAND/UL (ref 4.31–10.16)

## 2022-09-20 PROCEDURE — 99284 EMERGENCY DEPT VISIT MOD MDM: CPT

## 2022-09-20 PROCEDURE — 36415 COLL VENOUS BLD VENIPUNCTURE: CPT | Performed by: EMERGENCY MEDICINE

## 2022-09-20 PROCEDURE — 86850 RBC ANTIBODY SCREEN: CPT | Performed by: EMERGENCY MEDICINE

## 2022-09-20 PROCEDURE — 81001 URINALYSIS AUTO W/SCOPE: CPT

## 2022-09-20 PROCEDURE — 85025 COMPLETE CBC W/AUTO DIFF WBC: CPT | Performed by: EMERGENCY MEDICINE

## 2022-09-20 PROCEDURE — 86900 BLOOD TYPING SEROLOGIC ABO: CPT | Performed by: EMERGENCY MEDICINE

## 2022-09-20 PROCEDURE — 86901 BLOOD TYPING SEROLOGIC RH(D): CPT | Performed by: EMERGENCY MEDICINE

## 2022-09-20 PROCEDURE — 84702 CHORIONIC GONADOTROPIN TEST: CPT | Performed by: EMERGENCY MEDICINE

## 2022-09-20 RX ORDER — ACETAMINOPHEN 325 MG/1
650 TABLET ORAL ONCE
Status: COMPLETED | OUTPATIENT
Start: 2022-09-20 | End: 2022-09-20

## 2022-09-20 RX ADMIN — ACETAMINOPHEN 650 MG: 325 TABLET, FILM COATED ORAL at 23:33

## 2022-09-20 NOTE — Clinical Note
Shamir Hay was seen and treated in our emergency department on 9/20/2022  Diagnosis:     Steffany Gardiner  is off the rest of the shift today  She may return on this date: If you have any questions or concerns, please don't hesitate to call        Harper Stokes MD    ______________________________           _______________          _______________  Hospital Representative                              Date                                Time

## 2022-09-20 NOTE — Clinical Note
accompanied Mara Sargent to the emergency department on 9/20/2022  Return date if applicable: If you have any questions or concerns, please don't hesitate to call        Alonso Larsen MD

## 2022-09-20 NOTE — Clinical Note
rabia Mclaughlin to the emergency department on 9/20/2022  Return date if applicable: If you have any questions or concerns, please don't hesitate to call        Oziel Quiros MD

## 2022-09-21 ENCOUNTER — APPOINTMENT (OUTPATIENT)
Dept: RADIOLOGY | Facility: HOSPITAL | Age: 37
End: 2022-09-21
Payer: COMMERCIAL

## 2022-09-21 VITALS
OXYGEN SATURATION: 99 % | RESPIRATION RATE: 18 BRPM | DIASTOLIC BLOOD PRESSURE: 75 MMHG | HEART RATE: 68 BPM | SYSTOLIC BLOOD PRESSURE: 122 MMHG | TEMPERATURE: 97.5 F

## 2022-09-21 LAB
ABO GROUP BLD: NORMAL
B-HCG SERPL-ACNC: ABNORMAL MIU/ML
BACTERIA UR QL AUTO: NORMAL /HPF
BLD GP AB SCN SERPL QL: NEGATIVE
NON-SQ EPI CELLS URNS QL MICRO: NORMAL /HPF
RBC #/AREA URNS AUTO: NORMAL /HPF
RH BLD: NEGATIVE
SPECIMEN EXPIRATION DATE: NORMAL
WBC #/AREA URNS AUTO: NORMAL /HPF

## 2022-09-21 PROCEDURE — 76815 OB US LIMITED FETUS(S): CPT | Performed by: EMERGENCY MEDICINE

## 2022-09-21 PROCEDURE — 76801 OB US < 14 WKS SINGLE FETUS: CPT

## 2022-09-21 PROCEDURE — 99284 EMERGENCY DEPT VISIT MOD MDM: CPT | Performed by: EMERGENCY MEDICINE

## 2022-09-21 PROCEDURE — 36415 COLL VENOUS BLD VENIPUNCTURE: CPT | Performed by: EMERGENCY MEDICINE

## 2022-09-21 PROCEDURE — 96372 THER/PROPH/DIAG INJ SC/IM: CPT

## 2022-09-21 RX ADMIN — HUMAN RHO(D) IMMUNE GLOBULIN 300 MCG: 300 INJECTION, SOLUTION INTRAMUSCULAR at 05:06

## 2022-09-21 NOTE — ED ATTENDING ATTESTATION
9/20/2022  IFifi DO, saw and evaluated the patient  I have discussed the patient with the resident/non-physician practitioner and agree with the resident's/non-physician practitioner's findings, Plan of Care, and MDM as documented in the resident's/non-physician practitioner's note, except where noted  All available labs and Radiology studies were reviewed  I was present for key portions of any procedure(s) performed by the resident/non-physician practitioner and I was immediately available to provide assistance  At this point I agree with the current assessment done in the Emergency Department  I have conducted an independent evaluation of this patient a history and physical is as follows:    27-year-old female presents with vaginal bleeding  Patient is approximately 7 weeks pregnant  Reports cramping that started yesterday and vaginal bleeding  Started as scant bleeding but increased today  Denies dysuria  On exam-no acute distress, heart regular, no respiratory distress, abdomen soft  Plan-check labs including quant, type and Rh    Check urine, ultrasound    ED Course         Critical Care Time  Procedures

## 2022-09-21 NOTE — DISCHARGE INSTRUCTIONS
You are pregnant  You need to see an OBGYN very soon    You can use Tylenol for management of your symptoms  You should follow up with your OBGYN to assess for resolution of your symptoms and to determine if there is further evaluation that needs to be performed      Return to the emergency department if you have any symptoms of worsening bleeding or pain

## 2022-09-21 NOTE — ED PROVIDER NOTES
History  Chief Complaint   Patient presents with    Vaginal Bleeding - Pregnant     Pt reports 7 weeks pregnant and started with spotting yesterday that has increased in volume since it started  Had 1 episode of bright red blood in toilet  HPI    46-year-old female, , 7 weeks pregnant presenting for evaluation of abdominal cramping and vaginal bleeding x1 day  Her last known menstrual period was   Symptoms began yesterday  Started with scant bleeding and increased today  Patient has not taken any medications for symptoms  Patient states she had an outpatient ultrasound that showed intrauterine pregnancy 2 weeks ago at [facility]  Denies chest pain, shortness of breath, numbness, weakness, lightheadedness, dysuria, fever, chills, nausea, vomiting, constipation  Prior to Admission Medications   Prescriptions Last Dose Informant Patient Reported? Taking?   aspirin-acetaminophen-caffeine (EXCEDRIN MIGRAINE) 250-250-65 MG per tablet   No No   Sig: Take 1 tablet by mouth every 6 (six) hours as needed for headaches   levothyroxine 50 mcg tablet   Yes No   Sig: Take 50 mcg by mouth every morning      Facility-Administered Medications: None       Past Medical History:   Diagnosis Date    Abnormal Pap smear of cervix     Disease of thyroid gland     HPV (human papilloma virus) infection        History reviewed  No pertinent surgical history      Family History   Problem Relation Age of Onset    Hyperlipidemia Mother     Diabetes Father     Kidney disease Father         on Hemodialysis    No Known Problems Sister     No Known Problems Brother     No Known Problems Daughter     No Known Problems Son     No Known Problems Maternal Grandmother     No Known Problems Maternal Grandfather     Diabetes Paternal Grandfather     Kidney disease Paternal Grandfather     No Known Problems Sister     No Known Problems Maternal Aunt     No Known Problems Maternal Aunt     No Known Problems Maternal Aunt     No Known Problems Paternal Aunt      I have reviewed and agree with the history as documented  E-Cigarette/Vaping    E-Cigarette Use Never User      E-Cigarette/Vaping Substances    Nicotine No     THC No     CBD No     Flavoring No     Other No     Unknown No      Social History     Tobacco Use    Smoking status: Never Smoker    Smokeless tobacco: Never Used   Vaping Use    Vaping Use: Never used   Substance Use Topics    Alcohol use: Not Currently    Drug use: Never        Review of Systems   Constitutional: Negative for chills and fever  HENT: Negative for sore throat  Respiratory: Negative for cough and shortness of breath  Cardiovascular: Negative for chest pain, palpitations and leg swelling  Gastrointestinal: Positive for abdominal pain  Negative for diarrhea, nausea and vomiting  Genitourinary: Positive for vaginal bleeding  Negative for dysuria and frequency  Musculoskeletal: Negative for myalgias  Skin: Negative for rash and wound  Neurological: Negative for dizziness, light-headedness and headaches  All other systems reviewed and are negative  Physical Exam  ED Triage Vitals   Temperature Pulse Respirations Blood Pressure SpO2   09/20/22 2102 09/20/22 2102 09/20/22 2102 09/20/22 2102 09/20/22 2102   97 5 °F (36 4 °C) 75 18 123/83 100 %      Temp Source Heart Rate Source Patient Position - Orthostatic VS BP Location FiO2 (%)   09/20/22 2102 09/20/22 2102 09/20/22 2102 09/20/22 2102 --   Temporal Monitor Sitting Left arm       Pain Score       09/21/22 0307       No Pain             Orthostatic Vital Signs  Vitals:    09/20/22 2102 09/20/22 2348 09/21/22 0307   BP: 123/83 120/73 122/75   Pulse: 75 64 68   Patient Position - Orthostatic VS: Sitting         Physical Exam  Vitals and nursing note reviewed  Constitutional:       General: She is not in acute distress  Appearance: Normal appearance  She is not ill-appearing or toxic-appearing  HENT:      Head: Normocephalic and atraumatic  Right Ear: External ear normal       Left Ear: External ear normal       Nose: Nose normal       Mouth/Throat:      Mouth: Mucous membranes are moist       Pharynx: Oropharynx is clear  Eyes:      General: No scleral icterus  Extraocular Movements: Extraocular movements intact  Cardiovascular:      Rate and Rhythm: Normal rate and regular rhythm  Pulses: Normal pulses  Pulmonary:      Effort: Pulmonary effort is normal  No respiratory distress  Breath sounds: Normal breath sounds  Abdominal:      Palpations: Abdomen is soft  Tenderness: There is no abdominal tenderness  Musculoskeletal:         General: Normal range of motion  Cervical back: Normal range of motion and neck supple  Skin:     General: Skin is warm  Capillary Refill: Capillary refill takes less than 2 seconds  Neurological:      General: No focal deficit present  Mental Status: She is alert and oriented to person, place, and time           ED Medications  Medications   acetaminophen (TYLENOL) tablet 650 mg (650 mg Oral Given 9/20/22 2333)   Rho(D) immune globulin (RHOGAM ULTRA-FILTERED PLUS) IM injection 300 mcg (300 mcg Intramuscular Given 9/21/22 0506)       Diagnostic Studies  Results Reviewed     Procedure Component Value Units Date/Time    hCG, quantitative [[id number]]  (Abnormal) Collected: 09/20/22 2325    Lab Status: Final result Specimen: Blood from Arm, Left Updated: 09/21/22 0022     HCG, Quant 32,030 mIU/mL     Narrative:       Expected Ranges:     Approximate               Approximate HCG  Gestation age          Concentration ( mIU/mL)  _____________          ______________________   José Souza                      HCG values  0 2-1                       5-50  1-2                           2-3                         100-5000  3-4                         500-35940  4-5                         1000-78917  5-6 [id number]  6-8                         [id number]  8-12                        [id number]      Urine Microscopic [[id number]]  (Normal) Collected: 09/20/22 2340    Lab Status: Final result Specimen: Urine Updated: 09/21/22 0004     RBC, UA 1-2 /hpf      WBC, UA None Seen /hpf      Epithelial Cells Occasional /hpf      Bacteria, UA Occasional /hpf     CBC and differential [[id number]]  (Abnormal) Collected: 09/20/22 2325    Lab Status: Final result Specimen: Blood from Arm, Left Updated: 09/20/22 2343     WBC 10 37 Thousand/uL      RBC 4 38 Million/uL      Hemoglobin 13 3 g/dL      Hematocrit 40 1 %      MCV 92 fL      MCH 30 4 pg      MCHC 33 2 g/dL      RDW 12 9 %      MPV 12 8 fL      Platelets 935 Thousands/uL      nRBC 0 /100 WBCs      Neutrophils Relative 59 %      Immat GRANS % 1 %      Lymphocytes Relative 24 %      Monocytes Relative 12 %      Eosinophils Relative 3 %      Basophils Relative 1 %      Neutrophils Absolute 6 21 Thousands/µL      Immature Grans Absolute 0 06 Thousand/uL      Lymphocytes Absolute 2 52 Thousands/µL      Monocytes Absolute 1 19 Thousand/µL      Eosinophils Absolute 0 28 Thousand/µL      Basophils Absolute 0 11 Thousands/µL     Urine Macroscopic, POC [[id number]]  (Abnormal) Collected: 09/20/22 2340    Lab Status: Final result Specimen: Urine Updated: 09/20/22 2341     Color, UA Yellow     Clarity, UA Clear     pH, UA 6 0     Leukocytes, UA Negative     Nitrite, UA Negative     Protein, UA Negative mg/dl      Glucose, UA Negative mg/dl      Ketones, UA Negative mg/dl      Urobilinogen, UA 0 2 E U /dl      Bilirubin, UA Negative     Occult Blood, UA Trace     Specific [place], UA 1 010    Narrative:      CLINITEK RESULT                 US OB < 14 weeks with transvaginal   Final Result by Marcela Conklin DO (09/21 0104)      Single live intrauterine gestation at 6 weeks 1 day  ROBERT is 5/16[birth year]2023              Workstation performed: [id number]               Procedures  POC Pelvic US    Date/Time: 9/21/2022 12:18 AM  Performed by: Debo Rivera DO  Authorized by: Debo Rivera DO     Patient location:  ED  Other Assisting Provider: No    Procedure details:     Exam Type:  Diagnostic    Indications: evaluate for IUP and pregnant with vaginal bleeding      Assessment for: confirm intrauterine pregnancy and evaluate fetal viability      Technique:  Transabdominal obstetric (HCG+) exam    Views obtained: uterus (transverse and sagittal)      Image quality: diagnostic      Image availability:  Images available in PACS  Uterine findings:     Intrauterine pregnancy: not identified      Single gestation: not identified      Gestational sac: not identified      Yolk sac: not identified      Fetal heart rate: not identified    Interpretation:     Ultrasound impressions: indeterminate      Pregnancy findings: indeterminate            ED Course  ED Course as of 09/22/22 1347   Wed Sep 21, 2022   0023 HCG QUANTITATIVE(!): 32,030                                       MDM  Number of Diagnoses or Management Options  Threatened miscarriage  Diagnosis management comments: 29-year-old female presenting for evaluation of vaginal bleeding in the setting of early pregnancy  A differential diagnosis includes ectopic versus threatened miscarriage  Will check CBC to look for acute blood loss anemia, beta hCG quantitative, type and screen  Bedside ultrasound was not able to visualize an intrauterine pregnancy  Will get a formal ultrasound  Patient was signed out to ED resident prior to final disposition  Tentative plan:  Pending transvaginal ultrasound  Dispo per ultrasound, will need OB Gyne follow-up        Disposition  Final diagnoses:   Threatened miscarriage     Time reflects when diagnosis was documented in both MDM as applicable and the Disposition within this note     Time User Action Codes Description Comment    9/21/2022  3:41 AM Don Gibson Add [O20 0] Threatened miscarriage       ED Disposition     ED Disposition   Discharge    Condition   Stable    Date/Time   Wed Sep 21, 2022  3:42 AM    Comment              Follow-up Information     Follow up With Specialties Details Why Contact Info Additional [address] Emergency Department Emergency Medicine  If symptoms worsen [address] [phone]  6 [facility] Emergency Department, [address], [address]          Discharge Medication List as of 9/21/2022  4:42 AM      CONTINUE these medications which have NOT CHANGED    Details   aspirin-acetaminophen-caffeine (EXCEDRIN MIGRAINE) 250-250-65 MG per tablet Take 1 tablet by mouth every 6 (six) hours as needed for headaches, Starting Thu 8/25/2022, Normal      levothyroxine 50 mcg tablet Take 50 mcg by mouth every morning, Starting Thu 8/18/2022, Historical Med           No discharge procedures on file  PDMP Review     None           ED Provider  Attending physically available and evaluated Suzette 60  I managed the patient along with the ED Attending      Electronically Signed by         Ginny Rutledge DO  09/22/22 9964

## 2022-09-22 ENCOUNTER — OFFICE VISIT (OUTPATIENT)
Dept: URGENT CARE | Age: 37
End: 2022-09-22
Payer: COMMERCIAL

## 2022-09-22 VITALS
OXYGEN SATURATION: 100 % | HEART RATE: 79 BPM | SYSTOLIC BLOOD PRESSURE: 130 MMHG | WEIGHT: 177.6 LBS | DIASTOLIC BLOOD PRESSURE: 82 MMHG | BODY MASS INDEX: 29.55 KG/M2 | RESPIRATION RATE: 20 BRPM | TEMPERATURE: 98 F

## 2022-09-22 DIAGNOSIS — R31.9 HEMATURIA, UNSPECIFIED TYPE: Primary | ICD-10-CM

## 2022-09-22 LAB
SL AMB  POCT GLUCOSE, UA: ABNORMAL
SL AMB LEUKOCYTE ESTERASE,UA: ABNORMAL
SL AMB POCT BILIRUBIN,UA: ABNORMAL
SL AMB POCT BLOOD,UA: ABNORMAL
SL AMB POCT CLARITY,UA: ABNORMAL
SL AMB POCT COLOR,UA: YELLOW
SL AMB POCT KETONES,UA: ABNORMAL
SL AMB POCT NITRITE,UA: ABNORMAL
SL AMB POCT PH,UA: 7.5
SL AMB POCT SPECIFIC GRAVITY,UA: 1
SL AMB POCT URINE PROTEIN: ABNORMAL
SL AMB POCT UROBILINOGEN: 0.2

## 2022-09-22 PROCEDURE — 81002 URINALYSIS NONAUTO W/O SCOPE: CPT

## 2022-09-22 PROCEDURE — G0382 LEV 3 HOSP TYPE B ED VISIT: HCPCS

## 2022-09-22 PROCEDURE — 87086 URINE CULTURE/COLONY COUNT: CPT

## 2022-09-23 LAB — BACTERIA UR CULT: NORMAL

## 2022-09-23 NOTE — PROGRESS NOTES
[facility] [employer] [facility]        NAME: Jose R Mckeon is a 40 y o  female  : 1985    MRN: [id number]  DATE: 2022  TIME: 8:17 PM    Assessment and Plan   Hematuria, unspecified type [R31 9]  1  Hematuria, unspecified type  POCT urine dip    Urine culture     Urine dip negative, awaiting results of urine culture  Patient Instructions     Follow-up with OB as soon as possible  If symptoms worsen, please go to ER  Follow up with PCP in 3-5 days  Proceed to  ER if symptoms worsen  Chief Complaint     Chief Complaint   Patient presents with    Fever     Fever and blood in the urine   7 weeks pregnant     Generalized Body Aches    Fatigue     Fever since yesterday  Blood in the urine for 1 month   Body ache and fatigue for 2 days  Patient is 7 weeks pregnant         History of Present Illness       Patient presenting for evaluation of fever and possible hematuria  Patient states that over the past month she has noticed blood in her urine  She also states that yesterday she developed a fever, body aches and fatigue  She states that she feels fevers, but denies taking her temperature  Patient states that she is 7 weeks pregnant  She states that she was in the ER 2 days ago with the same symptoms, and they did a urinalysis with negative results  She denies any vaginal bleeding, discharge or cramping  Patient states that she is an appointment with her OB tomorrow  Patient states that she has taken Tylenol, and is provided mild relief of her symptoms  Fever  Associated symptoms include fatigue  Pertinent negatives include no abdominal pain, arthralgias, chest pain, chills, coughing, fever, rash, sore throat or vomiting  Generalized Body Aches  Associated symptoms include fatigue  Pertinent negatives include no ear pain, eye pain, sore throat, fever, chest pain, coughing, shortness of breath, abdominal pain, vomiting or rash  Fatigue  Associated symptoms include fatigue   Pertinent negatives include no abdominal pain, arthralgias, chest pain, chills, coughing, fever, rash, sore throat or vomiting  Review of Systems   Review of Systems   Constitutional: Positive for fatigue  Negative for chills and fever  HENT: Negative for ear pain and sore throat  Eyes: Negative for pain and visual disturbance  Respiratory: Negative for cough and shortness of breath  Cardiovascular: Negative for chest pain and palpitations  Gastrointestinal: Negative for abdominal pain and vomiting  Genitourinary: Positive for hematuria  Negative for dysuria  Musculoskeletal: Negative for arthralgias and back pain  Skin: Negative for color change and rash  Neurological: Negative for seizures and syncope  All other systems reviewed and are negative  Current Medications       Current Outpatient Medications:     aspirin-acetaminophen-caffeine (EXCEDRIN MIGRAINE) 250-250-65 MG per tablet, Take 1 tablet by mouth every 6 (six) hours as needed for headaches, Disp: 30 tablet, Rfl: 1    levothyroxine 50 mcg tablet, Take 50 mcg by mouth every morning, Disp: , Rfl:     Current Allergies     Allergies as of 09/22/2022    (No Known Allergies)            The following portions of the patient's history were reviewed and updated as appropriate: allergies, current medications, past family history, past medical history, past social history, past surgical history and problem list      Past Medical History:   Diagnosis Date    Abnormal Pap smear of cervix     Disease of thyroid gland     HPV (human papilloma virus) infection        History reviewed  No pertinent surgical history      Family History   Problem Relation Age of Onset    Hyperlipidemia Mother     Diabetes Father     Kidney disease Father         on Hemodialysis    No Known Problems Sister     No Known Problems Brother     No Known Problems Daughter     No Known Problems Son     No Known Problems Maternal Grandmother     No Known Problems Maternal Grandfather     Diabetes Paternal Grandfather     Kidney disease Paternal Grandfather     No Known Problems Sister     No Known Problems Maternal Aunt     No Known Problems Maternal Aunt     No Known Problems Maternal Aunt     No Known Problems Paternal Aunt          Medications have been verified  Objective   /82   Pulse 79   Temp 98 °F (36 7 °C) (Temporal)   Resp 20   Wt 80 6 kg (177 lb 9 6 oz)   SpO2 100%   BMI 29 55 kg/m²        Physical Exam     Physical Exam  Vitals and nursing note reviewed  Constitutional:       General: She is not in acute distress  Appearance: Normal appearance  She is normal weight  She is not ill-appearing, toxic-appearing or diaphoretic  HENT:      Head: Normocephalic and atraumatic  Right Ear: Tympanic membrane normal       Left Ear: Tympanic membrane normal       Nose: No congestion or rhinorrhea  Mouth/Throat:      Mouth: Mucous membranes are moist       Pharynx: Oropharynx is clear  No oropharyngeal exudate or posterior oropharyngeal erythema  Eyes:      General:         Right eye: No discharge  Left eye: No discharge  Extraocular Movements: Extraocular movements intact  Conjunctiva/sclera: Conjunctivae normal       Pupils: Pupils are equal, round, and reactive to light  Cardiovascular:      Rate and Rhythm: Normal rate and regular rhythm  Pulses: Normal pulses  Heart sounds: Normal heart sounds  No murmur heard  No friction rub  No gallop  Pulmonary:      Effort: Pulmonary effort is normal  No respiratory distress  Breath sounds: Normal breath sounds  No stridor  No wheezing, rhonchi or rales  Chest:      Chest wall: No tenderness  Abdominal:      General: Abdomen is flat  Bowel sounds are normal       Palpations: Abdomen is soft  Tenderness: There is no abdominal tenderness  There is no right CVA tenderness, left CVA tenderness or guarding     Musculoskeletal: General: No tenderness  Normal range of motion  Cervical back: Normal range of motion and neck supple  No tenderness  Skin:     General: Skin is warm and dry  Capillary Refill: Capillary refill takes less than 2 seconds  Neurological:      General: No focal deficit present  Mental Status: She is alert and oriented to person, place, and time     Psychiatric:         Mood and Affect: Mood normal          Behavior: Behavior normal

## 2022-10-03 ENCOUNTER — TELEPHONE (OUTPATIENT)
Dept: INTERNAL MEDICINE CLINIC | Facility: CLINIC | Age: 37
End: 2022-10-03

## 2022-10-25 ENCOUNTER — OFFICE VISIT (OUTPATIENT)
Dept: INTERNAL MEDICINE CLINIC | Facility: CLINIC | Age: 37
End: 2022-10-25

## 2022-10-25 VITALS
DIASTOLIC BLOOD PRESSURE: 80 MMHG | BODY MASS INDEX: 29.32 KG/M2 | SYSTOLIC BLOOD PRESSURE: 117 MMHG | WEIGHT: 176 LBS | HEART RATE: 76 BPM | TEMPERATURE: 98.3 F | HEIGHT: 65 IN

## 2022-10-25 DIAGNOSIS — K21.9 GASTROESOPHAGEAL REFLUX DISEASE, UNSPECIFIED WHETHER ESOPHAGITIS PRESENT: Primary | ICD-10-CM

## 2022-10-25 PROCEDURE — 99214 OFFICE O/P EST MOD 30 MIN: CPT | Performed by: INTERNAL MEDICINE

## 2022-10-25 RX ORDER — FAMOTIDINE 20 MG/1
20 TABLET, FILM COATED ORAL DAILY
Qty: 30 TABLET | Refills: 0 | Status: SHIPPED | OUTPATIENT
Start: 2022-10-25 | End: 2022-11-24

## 2022-10-25 NOTE — PATIENT INSTRUCTIONS
I have prescribed you a new medication, Famotidine 20 mg daily  Take it before dinner each day  Please schedule a follow up in 4 weeks for your symptoms

## 2022-10-25 NOTE — PROGRESS NOTES
[facility]  INTERNAL MEDICINE ACUTE VISIT     PATIENT INFORMATION     Ciera Benavides   40 y o  female   MRN: [id number]    ASSESSMENT/PLAN     Diagnoses and all orders for this visit:    Gastroesophageal reflux disease, unspecified whether esophagitis present  -     famotidine (PEPCID) 20 mg tablet; Take 1 tablet (20 mg total) by mouth daily      Gastroesophageal Reflux Disease   -Patient presenting with epigastric pain, describes as "burning" after eating  Also describes bloating sensation after eating  She sometimes has nausea and vomiting with these symptoms    -Symptoms have been occurring for 4+ months but have gotten worse recently  -Patient is 3 months pregnant, following with OB   -History of hypothyroidism on Levothyroxine 75 mcg daily  Plan  -Will trial Famotidine 20 mg daily before dinner for reflux symptoms  -F/U in 4 weeks  CHIEF COMPLAINT     Chief Complaint   Patient presents with   • Follow-up     Bloated, laying down body feels like pins and needles       HISTORY OF PRESENT ILLNESS     Patient is a 41 y/o female with PMH of hypothyroidism and tension headaches, currently 3 months pregnant, presenting with epigastric pain which she describes as "burning" after eating  Patient is [place]o speaking, history was obtained with use of a   These epigastric symptoms have been occurring for 4 months or more but have gotten worse recently  She feels like there is an "acid, sourness" in her mouth when it happens  She also notes a sensation of bloating after eating as well as nausea and vomiting of food  Patient is following with [facility] for her pregnancy and has been seeing a fertility specialist through [address] [place] [address] [address] well  Her levothyroxine dose was recently adjusted for her pregnancy  Patient denies smoking, alcohol use, recreational drug use  She has had less appetite recently because of the burning pain   She is not interested in influenza vaccination at this visit      REVIEW OF SYSTEMS     Review of Systems   Constitutional: Positive for fatigue and unexpected weight change  Negative for chills and fever  Weight gain during pregnancy   HENT: Negative for hearing loss and sore throat  Eyes: Negative for visual disturbance  Respiratory: Negative for cough and shortness of breath  Cardiovascular: Negative for chest pain  Gastrointestinal: Positive for abdominal distention, constipation, nausea and vomiting  Negative for abdominal pain, blood in stool and diarrhea  On and off constipation  Endocrine: Negative for polyuria  Genitourinary: Negative for dysuria and hematuria  Musculoskeletal: Negative for arthralgias and back pain  Skin: Negative for rash and wound  Neurological: Positive for headaches  Negative for dizziness, tremors, seizures, weakness and light-headedness  Rare headache   Psychiatric/Behavioral: Negative for dysphoric mood  The patient is not nervous/anxious  OBJECTIVE     Vitals:    10/25/22 1110   BP: 117/80   BP Location: Left arm   Patient Position: Sitting   Cuff Size: Adult   Pulse: 76   Temp: 98 3 °F (36 8 °C)   TempSrc: Temporal   Weight: 79 8 kg (176 lb)   Height: 5' 5" (1 651 m)     Physical Exam  Vitals reviewed  Constitutional:       Appearance: Normal appearance  HENT:      Head: Normocephalic and atraumatic  Right Ear: External ear normal       Left Ear: External ear normal       Mouth/Throat:      Mouth: Mucous membranes are moist    Eyes:      Extraocular Movements: Extraocular movements intact  Pupils: Pupils are equal, round, and reactive to light  Cardiovascular:      Rate and Rhythm: Normal rate and regular rhythm  Pulses: Normal pulses  Heart sounds: Normal heart sounds  Pulmonary:      Effort: Pulmonary effort is normal       Breath sounds: Normal breath sounds  Abdominal:      General: Abdomen is flat  There is no distension  Palpations: Abdomen is soft  Tenderness: There is no abdominal tenderness  There is no right CVA tenderness or left CVA tenderness  Musculoskeletal:         General: Normal range of motion  Cervical back: Normal range of motion  Right lower leg: No edema  Left lower leg: No edema  Skin:     General: Skin is warm  Capillary Refill: Capillary refill takes less than 2 seconds  Neurological:      General: No focal deficit present  Mental Status: She is alert and oriented to person, place, and time  CURRENT MEDICATIONS     Current Outpatient Medications:   •  levothyroxine 50 mcg tablet, Take 50 mcg by mouth every morning, Disp: , Rfl:   •  aspirin-acetaminophen-caffeine (EXCEDRIN MIGRAINE) 740-174-37 MG per tablet, Take 1 tablet by mouth every 6 (six) hours as needed for headaches (Patient not taking: Reported on 10/25/2022), Disp: 30 tablet, Rfl: 1    PAST MEDICAL & SURGICAL HISTORY     Past Medical History:   Diagnosis Date   • Abnormal Pap smear of cervix    • Disease of thyroid gland    • HPV (human papilloma virus) infection      History reviewed  No pertinent surgical history    SOCIAL & FAMILY HISTORY     Social History     Socioeconomic History   • Marital status: /Civil Union     Spouse name: Not on file   • Number of children: Not on file   • Years of education: Not on file   • Highest education level: Not on file   Occupational History   • Not on file   Tobacco Use   • Smoking status: Never Smoker   • Smokeless tobacco: Never Used   Vaping Use   • Vaping Use: Never used   Substance and Sexual Activity   • Alcohol use: Not Currently   • Drug use: Never   • Sexual activity: Yes     Partners: Male     Comment: Jitendra Morley    Other Topics Concern   • Not on file   Social History Narrative   • Not on file     Social Determinants of Health     Financial Resource Strain: Low Risk    • Difficulty of Paying Living Expenses: Not hard at all   Food Insecurity: No Food Insecurity   • Worried About Running Out of Food in the Last Year: Never true   • Ran Out of Food in the Last Year: Never true   Transportation Needs: No Transportation Needs   • Lack of Transportation (Medical): No   • Lack of Transportation (Non-Medical): No   Physical Activity: Not on file   Stress: Not on file   Social Connections: Not on file   Intimate Partner Violence: Not on file   Housing Stability: Not on file     Social History     Substance and Sexual Activity   Alcohol Use Not Currently     Social History     Substance and Sexual Activity   Drug Use Never     Social History     Tobacco Use   Smoking Status Never Smoker   Smokeless Tobacco Never Used     Family History   Problem Relation Age of Onset   • Hyperlipidemia Mother    • Diabetes Father    • Kidney disease Father         on Hemodialysis   • No Known Problems Sister    • No Known Problems Brother    • No Known Problems Daughter    • No Known Problems Son    • No Known Problems Maternal Grandmother    • No Known Problems Maternal Grandfather    • Diabetes Paternal Grandfather    • Kidney disease Paternal Grandfather    • No Known Problems Sister    • No Known Problems Maternal Aunt    • No Known Problems Maternal Aunt    • No Known Problems Maternal Aunt    • No Known Problems Paternal Aunt          BMI Counseling: Body mass index is 29 29 kg/m²  The BMI is above normal  Nutrition recommendations include decreasing portion sizes, encouraging healthy choices of fruits and vegetables, decreasing fast food intake and reducing intake of cholesterol  Exercise recommendations include moderate physical activity 150 minutes/week and exercising 3-5 times per week  Rationale for BMI follow-up plan is due to patient being overweight or obese         ==  Bipin Ley MD PGY1  [address] [place] [address] , [address] [address] [facility] [facility]  Office: [phone]  Fax: [phone]

## 2022-11-16 DIAGNOSIS — K21.9 GASTROESOPHAGEAL REFLUX DISEASE, UNSPECIFIED WHETHER ESOPHAGITIS PRESENT: ICD-10-CM

## 2022-11-16 RX ORDER — FAMOTIDINE 20 MG/1
TABLET, FILM COATED ORAL
Qty: 90 TABLET | Refills: 1 | Status: SHIPPED | OUTPATIENT
Start: 2022-11-16

## 2023-11-13 ENCOUNTER — HOSPITAL ENCOUNTER (EMERGENCY)
Facility: HOSPITAL | Age: 38
Discharge: HOME | End: 2023-11-13
Attending: EMERGENCY MEDICINE
Payer: COMMERCIAL

## 2023-11-13 VITALS
SYSTOLIC BLOOD PRESSURE: 100 MMHG | OXYGEN SATURATION: 100 % | RESPIRATION RATE: 18 BRPM | HEART RATE: 56 BPM | DIASTOLIC BLOOD PRESSURE: 52 MMHG | TEMPERATURE: 97.9 F

## 2023-11-13 DIAGNOSIS — O26.891 ABDOMINAL PAIN DURING PREGNANCY IN FIRST TRIMESTER: Primary | ICD-10-CM

## 2023-11-13 DIAGNOSIS — R10.9 ABDOMINAL PAIN DURING PREGNANCY IN FIRST TRIMESTER: Primary | ICD-10-CM

## 2023-11-13 DIAGNOSIS — K59.00 CONSTIPATION: ICD-10-CM

## 2023-11-13 LAB
ALBUMIN SERPL BCP-MCNC: 3.9 G/DL (ref 3.5–5)
ALP SERPL-CCNC: 81 U/L (ref 34–104)
ALT SERPL W P-5'-P-CCNC: 12 U/L (ref 7–52)
ANION GAP SERPL CALCULATED.3IONS-SCNC: 8 MMOL/L
AST SERPL W P-5'-P-CCNC: 16 U/L (ref 13–39)
BASOPHILS # BLD AUTO: 0.1 THOUSANDS/ÂΜL (ref 0–0.1)
BASOPHILS NFR BLD AUTO: 1 % (ref 0–1)
BILIRUB SERPL-MCNC: 0.25 MG/DL (ref 0.2–1)
BILIRUB UR QL STRIP: NEGATIVE
BUN SERPL-MCNC: 11 MG/DL (ref 5–25)
CALCIUM SERPL-MCNC: 8.9 MG/DL (ref 8.4–10.2)
CHLORIDE SERPL-SCNC: 101 MMOL/L (ref 96–108)
CLARITY UR: CLEAR
CO2 SERPL-SCNC: 25 MMOL/L (ref 21–32)
COLOR UR: COLORLESS
CREAT SERPL-MCNC: 0.76 MG/DL (ref 0.6–1.3)
EOSINOPHIL # BLD AUTO: 0.21 THOUSAND/ÂΜL (ref 0–0.61)
EOSINOPHIL NFR BLD AUTO: 2 % (ref 0–6)
ERYTHROCYTE [DISTWIDTH] IN BLOOD BY AUTOMATED COUNT: 13 % (ref 11.6–15.1)
GFR SERPL CREATININE-BSD FRML MDRD: 100 ML/MIN/1.73SQ M
GLUCOSE SERPL-MCNC: 88 MG/DL (ref 65–140)
GLUCOSE UR STRIP-MCNC: NEGATIVE MG/DL
HCT VFR BLD AUTO: 39 % (ref 34.8–46.1)
HGB BLD-MCNC: 13.6 G/DL (ref 11.5–15.4)
HGB UR QL STRIP.AUTO: NEGATIVE
IMM GRANULOCYTES # BLD AUTO: 0.07 THOUSAND/UL (ref 0–0.2)
IMM GRANULOCYTES NFR BLD AUTO: 1 % (ref 0–2)
KETONES UR STRIP-MCNC: NEGATIVE MG/DL
LEUKOCYTE ESTERASE UR QL STRIP: NEGATIVE
LIPASE SERPL-CCNC: 34 U/L (ref 11–82)
LYMPHOCYTES # BLD AUTO: 3.03 THOUSANDS/ÂΜL (ref 0.6–4.47)
LYMPHOCYTES NFR BLD AUTO: 24 % (ref 14–44)
MCH RBC QN AUTO: 31.1 PG (ref 26.8–34.3)
MCHC RBC AUTO-ENTMCNC: 34.9 G/DL (ref 31.4–37.4)
MCV RBC AUTO: 89 FL (ref 82–98)
MONOCYTES # BLD AUTO: 1.13 THOUSAND/ÂΜL (ref 0.17–1.22)
MONOCYTES NFR BLD AUTO: 9 % (ref 4–12)
NEUTROPHILS # BLD AUTO: 7.92 THOUSANDS/ÂΜL (ref 1.85–7.62)
NEUTS SEG NFR BLD AUTO: 63 % (ref 43–75)
NITRITE UR QL STRIP: NEGATIVE
NRBC BLD AUTO-RTO: 0 /100 WBCS
PH UR STRIP.AUTO: 6.5 [PH]
PLATELET # BLD AUTO: 175 THOUSANDS/UL (ref 149–390)
PMV BLD AUTO: 12.8 FL (ref 8.9–12.7)
POTASSIUM SERPL-SCNC: 3.7 MMOL/L (ref 3.5–5.3)
PROT SERPL-MCNC: 6.6 G/DL (ref 6.4–8.4)
PROT UR STRIP-MCNC: NEGATIVE MG/DL
RBC # BLD AUTO: 4.38 MILLION/UL (ref 3.81–5.12)
SODIUM SERPL-SCNC: 134 MMOL/L (ref 135–147)
SP GR UR STRIP.AUTO: 1.01 (ref 1–1.03)
UROBILINOGEN UR STRIP-ACNC: <2 MG/DL
WBC # BLD AUTO: 12.46 THOUSAND/UL (ref 4.31–10.16)

## 2023-11-13 PROCEDURE — 99284 EMERGENCY DEPT VISIT MOD MDM: CPT

## 2023-11-13 PROCEDURE — 96375 TX/PRO/DX INJ NEW DRUG ADDON: CPT

## 2023-11-13 PROCEDURE — 96374 THER/PROPH/DIAG INJ IV PUSH: CPT

## 2023-11-13 PROCEDURE — 80053 COMPREHEN METABOLIC PANEL: CPT

## 2023-11-13 PROCEDURE — 83690 ASSAY OF LIPASE: CPT

## 2023-11-13 PROCEDURE — 87086 URINE CULTURE/COLONY COUNT: CPT

## 2023-11-13 PROCEDURE — 36415 COLL VENOUS BLD VENIPUNCTURE: CPT

## 2023-11-13 PROCEDURE — 85025 COMPLETE CBC W/AUTO DIFF WBC: CPT

## 2023-11-13 PROCEDURE — 99284 EMERGENCY DEPT VISIT MOD MDM: CPT | Performed by: EMERGENCY MEDICINE

## 2023-11-13 PROCEDURE — 96361 HYDRATE IV INFUSION ADD-ON: CPT

## 2023-11-13 PROCEDURE — 81003 URINALYSIS AUTO W/O SCOPE: CPT

## 2023-11-13 PROCEDURE — 76815 OB US LIMITED FETUS(S): CPT | Performed by: EMERGENCY MEDICINE

## 2023-11-13 RX ORDER — POLYETHYLENE GLYCOL 3350 17 G/17G
17 POWDER, FOR SOLUTION ORAL ONCE
Status: COMPLETED | OUTPATIENT
Start: 2023-11-13 | End: 2023-11-13

## 2023-11-13 RX ORDER — ONDANSETRON 4 MG/1
4 TABLET, FILM COATED ORAL EVERY 6 HOURS
Qty: 12 TABLET | Refills: 0 | Status: SHIPPED | OUTPATIENT
Start: 2023-11-13

## 2023-11-13 RX ORDER — ACETAMINOPHEN 325 MG/1
975 TABLET ORAL ONCE
Status: COMPLETED | OUTPATIENT
Start: 2023-11-13 | End: 2023-11-13

## 2023-11-13 RX ORDER — FAMOTIDINE 10 MG/ML
20 INJECTION, SOLUTION INTRAVENOUS ONCE
Status: COMPLETED | OUTPATIENT
Start: 2023-11-13 | End: 2023-11-13

## 2023-11-13 RX ORDER — ONDANSETRON 2 MG/ML
4 INJECTION INTRAMUSCULAR; INTRAVENOUS ONCE
Status: COMPLETED | OUTPATIENT
Start: 2023-11-13 | End: 2023-11-13

## 2023-11-13 RX ADMIN — FAMOTIDINE 20 MG: 10 INJECTION INTRAVENOUS at 02:19

## 2023-11-13 RX ADMIN — ACETAMINOPHEN 975 MG: 325 TABLET, FILM COATED ORAL at 02:19

## 2023-11-13 RX ADMIN — ONDANSETRON 4 MG: 2 INJECTION INTRAMUSCULAR; INTRAVENOUS at 02:19

## 2023-11-13 RX ADMIN — POLYETHYLENE GLYCOL 3350 17 G: 17 POWDER, FOR SOLUTION ORAL at 03:49

## 2023-11-13 RX ADMIN — SODIUM CHLORIDE 1000 ML: 0.9 INJECTION, SOLUTION INTRAVENOUS at 02:26

## 2023-11-13 NOTE — DISCHARGE INSTRUCTIONS
You were seen and evaluated in the emergency department for abdominal pain. IUP identified in the uterus. Lab work was unremarkable all normal.  No UTI. Lease follow-up with your OB/GYN within 48 hours. Please follow-up with your PCP. We will send nausea medication to the pharmacy.   If your symptoms worsen or persist please return to the emergency department for further evaluation and management

## 2023-11-13 NOTE — ED PROVIDER NOTES
History  Chief Complaint   Patient presents with    Abdominal Pain Pregnant     Pt reports having abdominal pain x2 weeks that has gotten worse along with nausea and vomiting. Pt reports also being constipated and dizzy. States being 8 weeks pregnant      Patient is 71-year-old female G2, P1 at 8 weeks presenting for evaluation of abdominal pain. Patient reports that her symptoms began gradually 3 days ago. Has also been experiencing x2 weeks of constipation and nausea in the morning. Had 2 episodes of nonbloody nonbilious vomiting today. Denies fever chills diarrhea chest pain shortness of breath urinary complaints such as dysuria hematuria frequency, vaginal bleeding vaginal discharge, back pain or any other complaints at this time. Prior to Admission Medications   Prescriptions Last Dose Informant Patient Reported? Taking?   aspirin-acetaminophen-caffeine (EXCEDRIN MIGRAINE) 250-250-65 MG per tablet   No No   Sig: Take 1 tablet by mouth every 6 (six) hours as needed for headaches   Patient not taking: Reported on 10/25/2022   famotidine (PEPCID) 20 mg tablet   No No   Sig: TAKE 1 TABLET BY MOUTH EVERY DAY   levothyroxine 50 mcg tablet   Yes No   Sig: Take 50 mcg by mouth every morning      Facility-Administered Medications: None       Past Medical History:   Diagnosis Date    Abnormal Pap smear of cervix     Disease of thyroid gland     HPV (human papilloma virus) infection        History reviewed. No pertinent surgical history.     Family History   Problem Relation Age of Onset    Hyperlipidemia Mother     Diabetes Father     Kidney disease Father         on Hemodialysis    No Known Problems Sister     No Known Problems Brother     No Known Problems Daughter     No Known Problems Son     No Known Problems Maternal Grandmother     No Known Problems Maternal Grandfather     Diabetes Paternal Grandfather     Kidney disease Paternal Grandfather     No Known Problems Sister     No Known Problems Maternal Aunt     No Known Problems Maternal Aunt     No Known Problems Maternal Aunt     No Known Problems Paternal Aunt      I have reviewed and agree with the history as documented. E-Cigarette/Vaping    E-Cigarette Use Never User      E-Cigarette/Vaping Substances    Nicotine No     THC No     CBD No     Flavoring No     Other No     Unknown No      Social History     Tobacco Use    Smoking status: Never    Smokeless tobacco: Never   Vaping Use    Vaping Use: Never used   Substance Use Topics    Alcohol use: Not Currently    Drug use: Never        Review of Systems   Constitutional:  Negative for chills and fever. HENT:  Negative for ear pain and sore throat. Eyes:  Negative for pain and visual disturbance. Respiratory:  Negative for cough and shortness of breath. Cardiovascular:  Negative for chest pain and palpitations. Gastrointestinal:  Positive for abdominal pain, constipation, nausea and vomiting. Genitourinary:  Negative for dysuria, flank pain, frequency, hematuria, vaginal bleeding and vaginal discharge. Musculoskeletal:  Negative for arthralgias and back pain. Skin:  Negative for color change and rash. Neurological:  Negative for seizures and syncope. All other systems reviewed and are negative. Physical Exam  ED Triage Vitals [11/13/23 0139]   Temperature Pulse Respirations Blood Pressure SpO2   97.9 °F (36.6 °C) 88 18 141/83 99 %      Temp Source Heart Rate Source Patient Position - Orthostatic VS BP Location FiO2 (%)   Tympanic Monitor Sitting Left arm --      Pain Score       10 - Worst Possible Pain             Orthostatic Vital Signs  Vitals:    11/13/23 0139   BP: 141/83   Pulse: 88   Patient Position - Orthostatic VS: Sitting       Physical Exam  Vitals and nursing note reviewed. Constitutional:       General: She is not in acute distress. Appearance: She is well-developed. She is not ill-appearing. HENT:      Head: Normocephalic and atraumatic.       Mouth/Throat: Mouth: Mucous membranes are moist.   Eyes:      Conjunctiva/sclera: Conjunctivae normal.   Cardiovascular:      Rate and Rhythm: Normal rate and regular rhythm. Heart sounds: No murmur heard. Pulmonary:      Effort: Pulmonary effort is normal. No respiratory distress. Breath sounds: Normal breath sounds. Abdominal:      Palpations: Abdomen is soft. Tenderness: There is abdominal tenderness in the right upper quadrant, right lower quadrant, suprapubic area and left lower quadrant. There is no right CVA tenderness, left CVA tenderness, guarding or rebound. Musculoskeletal:         General: No swelling. Normal range of motion. Cervical back: Normal range of motion and neck supple. Skin:     General: Skin is warm and dry. Capillary Refill: Capillary refill takes less than 2 seconds. Neurological:      General: No focal deficit present. Mental Status: She is alert and oriented to person, place, and time.          ED Medications  Medications   polyethylene glycol (MIRALAX) packet 17 g (has no administration in time range)   acetaminophen (TYLENOL) tablet 975 mg (975 mg Oral Given 11/13/23 0219)   sodium chloride 0.9 % bolus 1,000 mL (1,000 mL Intravenous New Bag 11/13/23 0226)   ondansetron (ZOFRAN) injection 4 mg (4 mg Intravenous Given 11/13/23 0219)   Famotidine (PF) (PEPCID) injection 20 mg (20 mg Intravenous Given 11/13/23 0219)       Diagnostic Studies  Results Reviewed       Procedure Component Value Units Date/Time    Comprehensive metabolic panel [[id number]]  (Abnormal) Collected: 11/13/23 0223    Lab Status: Final result Specimen: Blood from Arm, Right Updated: 11/13/23 0303     Sodium 134 mmol/L      Potassium 3.7 mmol/L      Chloride 101 mmol/L      CO2 25 mmol/L      ANION GAP 8 mmol/L      BUN 11 mg/dL      Creatinine 0.76 mg/dL      Glucose 88 mg/dL      Calcium 8.9 mg/dL      AST 16 U/L      ALT 12 U/L      Alkaline Phosphatase 81 U/L      Total Protein 6.6 g/dL Albumin 3.9 g/dL      Total Bilirubin 0.25 mg/dL      eGFR 100 ml/min/1.73sq m     Narrative:      [facility] guidelines for Chronic Kidney Disease (CKD):     Stage 1 with normal or high GFR (GFR > 90 mL/min/1.73 square meters)    Stage 2 Mild CKD (GFR = 60-89 mL/min/1.73 square meters)    Stage 3A Moderate CKD (GFR = 45-59 mL/min/1.73 square meters)    Stage 3B Moderate CKD (GFR = 30-44 mL/min/1.73 square meters)    Stage 4 Severe CKD (GFR = 15-29 mL/min/1.73 square meters)    Stage 5 End Stage CKD (GFR <15 mL/min/1.73 square meters)  Note: GFR calculation is accurate only with a steady state creatinine    Lipase [[id number]]  (Normal) Collected: 11/13/23 0223    Lab Status: Final result Specimen: Blood from Arm, Right Updated: 11/13/23 0303     Lipase 34 u/L     UA w Reflex to Microscopic w Reflex to Culture [[id number]] Collected: 11/13/23 0230    Lab Status: Final result Specimen: Urine, Clean Catch Updated: 11/13/23 0248     Color, UA Colorless     Clarity, UA Clear     Specific Gravity, UA 1.013     pH, UA 6.5     Leukocytes, UA Negative     Nitrite, UA Negative     Protein, UA Negative mg/dl      Glucose, UA Negative mg/dl      Ketones, UA Negative mg/dl      Urobilinogen, UA <2.0 mg/dl      Bilirubin, UA Negative     Occult Blood, UA Negative     URINE COMMENT --    Urine culture [[id number]] Collected: 11/13/23 0230    Lab Status:  In process Specimen: Urine, Clean Catch Updated: 11/13/23 0248    CBC and differential [[id number]]  (Abnormal) Collected: 11/13/23 0223    Lab Status: Final result Specimen: Blood from Arm, Right Updated: 11/13/23 0244     WBC 12.46 Thousand/uL      RBC 4.38 Million/uL      Hemoglobin 13.6 g/dL      Hematocrit 39.0 %      MCV 89 fL      MCH 31.1 pg      MCHC 34.9 g/dL      RDW 13.0 %      MPV 12.8 fL      Platelets 701 Thousands/uL      nRBC 0 /100 WBCs      Neutrophils Relative 63 %      Immat GRANS % 1 %      Lymphocytes Relative 24 %      Monocytes Relative 9 %      Eosinophils Relative 2 %      Basophils Relative 1 %      Neutrophils Absolute 7.92 Thousands/µL      Immature Grans Absolute 0.07 Thousand/uL      Lymphocytes Absolute 3.03 Thousands/µL      Monocytes Absolute 1.13 Thousand/µL      Eosinophils Absolute 0.21 Thousand/µL      Basophils Absolute 0.10 Thousands/µL                    No orders to display         Procedures  POC Pelvic US    Date/Time: 11/13/2023 3:49 AM    Performed by: Anders Mayo DO  Authorized by: Anders Mayo DO    Patient location:  ED  Other Assisting Provider: Yes (comment) (Dr. Sergio Wood)    Procedure details:     Exam Type:  Diagnostic    Indications: evaluate for IUP and pregnant with abdominal pain      Assessment for: confirm intrauterine pregnancy      Technique:  Transabdominal obstetric (HCG+) exam    Views obtained: uterus (transverse and sagittal)      Image quality: diagnostic      Image availability:  Images available in PACS  Interpretation:     Ultrasound impressions: normal      Pregnancy findings: intrauterine pregnancy (IUP)    POC Biliary US    Date/Time: 11/13/2023 3:50 AM    Performed by: Anders Mayo DO  Authorized by: Anders aMyo DO    Patient location:  ED  Performed by:  Resident  Other Assisting Provider: Yes (comment) (Dr. Sergio Wood)    Procedure details:     Exam Type:  Diagnostic    Indications: upper right quadrant abdominal pain and nausea      Assessment for:  Cholecystitis and cholelithiasis    Image quality: non-diagnostic      Image availability:  Not saved  Findings:     Cholelithiasis: not identified    Interpretation:     Biliary ultrasound impressions: indeterminate          ED Course                                       Medical Decision Making  Patient is a 27-year-old female G2, P1 at 8 weeks presenting for evaluation of abdominal pain nausea vomiting constipation    Differential: Constipation versus first trimester hyperemesis versus cholecystitis versus UTI    Plan:  Will do bedside ultrasound to assess for IUP and rule out ectopic pregnancy. Will check abdominal labs. Will check urine. Will treat with analgesia antiemetics and fluids. Monitor and reassess    Bedside ultrasound as above, gallbladder not able to be visualized however no signs of inflammation. IUP identified in the uterus    Labs unremarkable mild leukocytosis however likely from vomiting. No other electrolyte abnormalities or derangements in need of correcting. Lipase normal.  Urine negative for UTI    Plan we will discharge patient home with outpatient follow-up with her OB/GYN discussed plan with patient is in agreement. Return precautions given patient verbalized understanding. Patient remained hemodynamically stable during her entire ED course and is cleared for discharge        Amount and/or Complexity of Data Reviewed  Labs: ordered. Risk  OTC drugs. Prescription drug management. Disposition  Final diagnoses:   Abdominal pain during pregnancy in first trimester   Constipation     Time reflects when diagnosis was documented in both MDM as applicable and the Disposition within this note       Time User Action Codes Description Comment    11/13/2023  3:27 AM Bethanne Blizzard Add [O26.891,  R10.9] Abdominal pain during pregnancy in first trimester     11/13/2023  3:33 AM Bethanne Blizzard Add [K59.00] Constipation           ED Disposition       ED Disposition   Discharge    Condition   Stable    Date/Time   Mon Nov 13, 2023  3:26 AM    Comment   Edmundo Koo discharge to home/self care.                    Follow-up Information       Follow up With Specialties Details Why [facility], DO Pulmonary Disease, Critical Care Medicine Call in 2 days  [address]  [phone]              Patient's Medications   Discharge Prescriptions    ONDANSETRON (ZOFRAN) 4 MG TABLET    Take 1 tablet (4 mg total) by mouth every 6 (six) hours       Start Date: 11/13/2023End Date: --       Order Dose: 4 mg       Quantity: 12 tablet    Refills: 0     No discharge procedures on file. PDMP Review       None             ED Provider  Attending physically available and evaluated Edmundo Koo. I managed the patient along with the ED Attending.     Electronically Signed by           Nguyen Saab DO  11/13/23 8801

## 2023-11-13 NOTE — ED ATTENDING ATTESTATION
11/13/2023  I, Marci Resendiz MD, saw and evaluated the patient. I have discussed the patient with the resident/non-physician practitioner and agree with the resident's/non-physician practitioner's findings, Plan of Care, and MDM as documented in the resident's/non-physician practitioner's note, except where noted. All available labs and Radiology studies were reviewed. I was present for key portions of any procedure(s) performed by the resident/non-physician practitioner and I was immediately available to provide assistance. At this point I agree with the current assessment done in the Emergency Department. I have conducted an independent evaluation of this patient a history and physical is as follows:    43-year-old female approximately 8 weeks pregnant presents to the emergency department for evaluation of abdominal pain. Patient reports progressive worsening pain over the past few days. Also states she has been constipated for the past few weeks. Does have some nausea and had 2 episodes of nonbloody nonbilious emesis earlier today. No associated fevers or chills. No chest pain or shortness of breath. No abnormal vaginal bleeding or discharge. No dysuria or hematuria. On exam, patient was comfortably bed in no acute distress, head is normocephalic atraumatic, pupils equal round reactive to light, neck is supple without meningismus signs, heart is regular rate and rhythm with intact distal pulses, no increased work of breathing, respiratory distress, or stridor. Diffuse abdominal tenderness, no rebound or guarding, no distention. Differential diagnosis includes but is not limited to gastritis, peptic ulcer disease, biliary colic, cholecystitis, pancreatitis, urinary tract infection, obstipation. Will check abdominal labs, urinalysis, and perform bedside ultrasound. Bedside ultrasound demonstrated definitive IUP with reassuring fetal heart rate.   Labs remarkable for mildly elevated leukocytosis which is likely reactive in the setting of vomiting. She will be discharged home. She was given strict return precautions.       ED Course         Critical Care Time  Procedures

## 2023-11-15 LAB — BACTERIA UR CULT: NORMAL

## 2024-12-23 ENCOUNTER — APPOINTMENT (EMERGENCY)
Dept: RADIOLOGY | Facility: HOSPITAL | Age: 39
End: 2024-12-23
Payer: COMMERCIAL

## 2024-12-23 ENCOUNTER — HOSPITAL ENCOUNTER (EMERGENCY)
Facility: HOSPITAL | Age: 39
Discharge: HOME | End: 2024-12-24
Attending: EMERGENCY MEDICINE
Payer: COMMERCIAL

## 2024-12-23 DIAGNOSIS — B02.9 HERPES ZOSTER: Primary | ICD-10-CM

## 2024-12-23 DIAGNOSIS — R21 RASH: ICD-10-CM

## 2024-12-23 DIAGNOSIS — Z91.89 AT HIGH RISK FOR TICK BORNE ILLNESS: ICD-10-CM

## 2024-12-23 LAB
ANION GAP SERPL CALCULATED.3IONS-SCNC: 7 MMOL/L (ref 4–13)
BASOPHILS # BLD AUTO: 0.1 THOUSANDS/ÂΜL (ref 0–0.1)
BASOPHILS NFR BLD AUTO: 2 % (ref 0–1)
BUN SERPL-MCNC: 16 MG/DL (ref 5–25)
CALCIUM SERPL-MCNC: 9.2 MG/DL (ref 8.4–10.2)
CHLORIDE SERPL-SCNC: 103 MMOL/L (ref 96–108)
CO2 SERPL-SCNC: 28 MMOL/L (ref 21–32)
CREAT SERPL-MCNC: 0.89 MG/DL (ref 0.6–1.3)
EOSINOPHIL # BLD AUTO: 0.32 THOUSAND/ÂΜL (ref 0–0.61)
EOSINOPHIL NFR BLD AUTO: 5 % (ref 0–6)
ERYTHROCYTE [DISTWIDTH] IN BLOOD BY AUTOMATED COUNT: 12.6 % (ref 11.6–15.1)
GFR SERPL CREATININE-BSD FRML MDRD: 82 ML/MIN/1.73SQ M
GLUCOSE SERPL-MCNC: 87 MG/DL (ref 65–140)
HCT VFR BLD AUTO: 43.4 % (ref 34.8–46.1)
HGB BLD-MCNC: 14.4 G/DL (ref 11.5–15.4)
IMM GRANULOCYTES # BLD AUTO: 0.02 THOUSAND/UL (ref 0–0.2)
IMM GRANULOCYTES NFR BLD AUTO: 0 % (ref 0–2)
LYMPHOCYTES # BLD AUTO: 2.28 THOUSANDS/ÂΜL (ref 0.6–4.47)
LYMPHOCYTES NFR BLD AUTO: 37 % (ref 14–44)
MCH RBC QN AUTO: 29.9 PG (ref 26.8–34.3)
MCHC RBC AUTO-ENTMCNC: 33.2 G/DL (ref 31.4–37.4)
MCV RBC AUTO: 90 FL (ref 82–98)
MONOCYTES # BLD AUTO: 0.63 THOUSAND/ÂΜL (ref 0.17–1.22)
MONOCYTES NFR BLD AUTO: 10 % (ref 4–12)
NEUTROPHILS # BLD AUTO: 2.89 THOUSANDS/ÂΜL (ref 1.85–7.62)
NEUTS SEG NFR BLD AUTO: 46 % (ref 43–75)
NRBC BLD AUTO-RTO: 0 /100 WBCS
PLATELET # BLD AUTO: 195 THOUSANDS/UL (ref 149–390)
PMV BLD AUTO: 12.2 FL (ref 8.9–12.7)
POTASSIUM SERPL-SCNC: 4.1 MMOL/L (ref 3.5–5.3)
RBC # BLD AUTO: 4.82 MILLION/UL (ref 3.81–5.12)
SODIUM SERPL-SCNC: 138 MMOL/L (ref 135–147)
WBC # BLD AUTO: 6.24 THOUSAND/UL (ref 4.31–10.16)

## 2024-12-23 PROCEDURE — 85025 COMPLETE CBC W/AUTO DIFF WBC: CPT

## 2024-12-23 PROCEDURE — 99284 EMERGENCY DEPT VISIT MOD MDM: CPT | Performed by: EMERGENCY MEDICINE

## 2024-12-23 PROCEDURE — 71045 X-RAY EXAM CHEST 1 VIEW: CPT

## 2024-12-23 PROCEDURE — 96374 THER/PROPH/DIAG INJ IV PUSH: CPT

## 2024-12-23 PROCEDURE — 99284 EMERGENCY DEPT VISIT MOD MDM: CPT

## 2024-12-23 PROCEDURE — 80048 BASIC METABOLIC PNL TOTAL CA: CPT

## 2024-12-23 PROCEDURE — 36415 COLL VENOUS BLD VENIPUNCTURE: CPT

## 2024-12-23 RX ORDER — KETOROLAC TROMETHAMINE 30 MG/ML
15 INJECTION, SOLUTION INTRAMUSCULAR; INTRAVENOUS ONCE
Status: COMPLETED | OUTPATIENT
Start: 2024-12-23 | End: 2024-12-23

## 2024-12-23 RX ORDER — IBUPROFEN 400 MG/1
400 TABLET, FILM COATED ORAL ONCE
Status: COMPLETED | OUTPATIENT
Start: 2024-12-23 | End: 2024-12-23

## 2024-12-23 RX ORDER — DOXYCYCLINE 100 MG/1
100 CAPSULE ORAL ONCE
Status: CANCELLED | OUTPATIENT
Start: 2024-12-23 | End: 2024-12-23

## 2024-12-23 RX ORDER — DOXYCYCLINE 100 MG/10ML
100 INJECTION, POWDER, LYOPHILIZED, FOR SOLUTION INTRAVENOUS ONCE
Status: DISCONTINUED | OUTPATIENT
Start: 2024-12-23 | End: 2024-12-23

## 2024-12-23 RX ADMIN — KETOROLAC TROMETHAMINE 15 MG: 30 INJECTION, SOLUTION INTRAMUSCULAR; INTRAVENOUS at 23:05

## 2024-12-23 RX ADMIN — IBUPROFEN 400 MG: 400 TABLET, FILM COATED ORAL at 21:52

## 2024-12-24 VITALS
OXYGEN SATURATION: 98 % | HEART RATE: 76 BPM | DIASTOLIC BLOOD PRESSURE: 79 MMHG | RESPIRATION RATE: 18 BRPM | SYSTOLIC BLOOD PRESSURE: 143 MMHG | TEMPERATURE: 97.8 F

## 2024-12-24 RX ORDER — OXYCODONE HYDROCHLORIDE 5 MG/1
5 TABLET ORAL ONCE
Refills: 0 | Status: COMPLETED | OUTPATIENT
Start: 2024-12-24 | End: 2024-12-24

## 2024-12-24 RX ORDER — VALACYCLOVIR HYDROCHLORIDE 500 MG/1
1000 TABLET, FILM COATED ORAL ONCE
Status: COMPLETED | OUTPATIENT
Start: 2024-12-24 | End: 2024-12-24

## 2024-12-24 RX ORDER — VALACYCLOVIR HYDROCHLORIDE 1 G/1
1000 TABLET, FILM COATED ORAL 3 TIMES DAILY
Qty: 20 TABLET | Refills: 0 | Status: SHIPPED | OUTPATIENT
Start: 2024-12-24 | End: 2024-12-31

## 2024-12-24 RX ORDER — DOXYCYCLINE 100 MG/1
100 CAPSULE ORAL ONCE
Status: COMPLETED | OUTPATIENT
Start: 2024-12-24 | End: 2024-12-24

## 2024-12-24 RX ORDER — DOXYCYCLINE 100 MG/1
100 CAPSULE ORAL 2 TIMES DAILY
Qty: 28 CAPSULE | Refills: 0 | Status: SHIPPED | OUTPATIENT
Start: 2024-12-24 | End: 2025-01-07

## 2024-12-24 RX ADMIN — OXYCODONE HYDROCHLORIDE 5 MG: 5 TABLET ORAL at 00:52

## 2024-12-24 RX ADMIN — VALACYCLOVIR HYDROCHLORIDE 1000 MG: 500 TABLET, FILM COATED ORAL at 00:52

## 2024-12-24 RX ADMIN — DOXYCYCLINE 100 MG: 100 CAPSULE ORAL at 00:28

## 2024-12-24 NOTE — ED ATTENDING ATTESTATION
12/23/2024  I, Steve Ruff MD, saw and evaluated the patient. I have discussed the patient with the resident/non-physician practitioner and agree with the resident's/non-physician practitioner's findings, Plan of Care, and MDM as documented in the resident's/non-physician practitioner's note, except where noted. All available labs and Radiology studies were reviewed.  I was present for key portions of any procedure(s) performed by the resident/non-physician practitioner and I was immediately available to provide assistance.       At this point I agree with the current assessment done in the Emergency Department.  I have conducted an independent evaluation of this patient a history and physical is as follows:    Rash and burning pain to right upper extremity.  Rash concerning for shingles.  Will treat with antivirals.    No headache.  No neck pain or stiffness.  No fever.    Results Reviewed       Procedure Component Value Units Date/Time    Basic metabolic panel [[id number]] Collected: 12/23/24 2306    Lab Status: Final result Specimen: Blood from Arm, Left Updated: 12/23/24 2334     Sodium 138 mmol/L      Potassium 4.1 mmol/L      Chloride 103 mmol/L      CO2 28 mmol/L      ANION GAP 7 mmol/L      BUN 16 mg/dL      Creatinine 0.89 mg/dL      Glucose 87 mg/dL      Calcium 9.2 mg/dL      eGFR 82 ml/min/1.73sq m     Narrative:      National Kidney Disease Foundation guidelines for Chronic Kidney Disease (CKD):     Stage 1 with normal or high GFR (GFR > 90 mL/min/1.73 square meters)    Stage 2 Mild CKD (GFR = 60-89 mL/min/1.73 square meters)    Stage 3A Moderate CKD (GFR = 45-59 mL/min/1.73 square meters)    Stage 3B Moderate CKD (GFR = 30-44 mL/min/1.73 square meters)    Stage 4 Severe CKD (GFR = 15-29 mL/min/1.73 square meters)    Stage 5 End Stage CKD (GFR <15 mL/min/1.73 square meters)  Note: GFR calculation is accurate only with a steady state creatinine    CBC and differential [[id number]]  (Abnormal)  Collected: 12/23/24 2306    Lab Status: Final result Specimen: Blood from Arm, Left Updated: 12/23/24 2320     WBC 6.24 Thousand/uL      RBC 4.82 Million/uL      Hemoglobin 14.4 g/dL      Hematocrit 43.4 %      MCV 90 fL      MCH 29.9 pg      MCHC 33.2 g/dL      RDW 12.6 %      MPV 12.2 fL      Platelets 195 Thousands/uL      nRBC 0 /100 WBCs      Segmented % 46 %      Immature Grans % 0 %      Lymphocytes % 37 %      Monocytes % 10 %      Eosinophils Relative 5 %      Basophils Relative 2 %      Absolute Neutrophils 2.89 Thousands/µL      Absolute Immature Grans 0.02 Thousand/uL      Absolute Lymphocytes 2.28 Thousands/µL      Absolute Monocytes 0.63 Thousand/µL      Eosinophils Absolute 0.32 Thousand/µL      Basophils Absolute 0.10 Thousands/µL               ED Course         Critical Care Time  Procedures

## 2024-12-24 NOTE — DISCHARGE INSTRUCTIONS
Take Valtrex 3 times a day for 7 days, discontinue acyclovir.  Continue doxycycline for the next 14 days 1 pill 2 times a day.  Avoid breast-feeding for the next month and continue formula as you have been.  Stay well-hydrated, alternate ibuprofen and Tylenol for pain.

## 2024-12-24 NOTE — ED PROVIDER NOTES
Time reflects when diagnosis was documented in both MDM as applicable and the Disposition within this note       Time User Action Codes Description Comment    12/24/2024 12:17 AM Maurice Clarke Add [B02.9] Herpes zoster     12/24/2024 12:17 AM Maurice Clarke Modify [B02.9] Herpes zoster possible    12/24/2024 12:22 AM Maurice Clarke Add [R21] Rash     12/24/2024 12:22 AM Maurice Clarke Add [Z91.89] At high risk for tick borne illness     12/24/2024 12:22 AM Maurice Clarke Modify [Z91.89] At high risk for tick borne illness possible          ED Disposition       ED Disposition   Discharge    Condition   Stable    Date/Time   Tue Dec 24, 2024 12:17 AM    Comment   Brendan Small discharge to home/self care.                   Assessment & Plan       Medical Decision Making  Ddx: herpes zoster, rickettsia dz, ehrlichiosis, other tick borne illness, paracoccidiocomycosis, coccididiomycosis, sporotrichosis, blastomycosis, histoplasmosis, other fungal disease, scabies, disseminated hsv, erythema multiforme    Pt is a 38 y/o female presenting to the ED with 3 days of pain of RUE with pruritic/painful lesions erupting on hand yesterday and ascending centrifugally. Pt was seen at [facility] yesterday and rx acyclovir provided, underdosed if in fact herpes zoster. Recent travel over 20 days to [facility] visiting mother. Has had subjective fevers and chills as well. Mother dose have a flower garden, was exposed to sand, was outdoors. Uncertain of insect bites, did not notice any ticks.. No vaginal discharge/lesions. Has hx of varicella as a child.    Discussed case with patient and , will continue doxycycline for 14 days in addition to valacyclovir which will be covered by her insurance most likely they stated.  Was underdosed with acyclovir when evaluated yesterday.  Will discern between possible more concerning disease pattern of tickborne illness versus herpes zoster.  Will cover both.   Patient states that she has no problem ceasing breast-feeding for the next month and that she has been supplementing with formula the majority the time over the past few weeks.  She is agreeable to return precautions as well as follow-up with her PCP in about a week.  Hemodynamically stable, no acute stress.        Amount and/or Complexity of Data Reviewed  Labs: ordered.  Radiology: ordered and independent interpretation performed.    Risk  Prescription drug management.        ED Course as of 12/24/24 1730   Tue Dec 24, 2024   0038 Discussed case with patient and , will continue doxycycline for 14 days in addition to valacyclovir which will be covered by her insurance most likely they stated.  Was underdosed with acyclovir when evaluated yesterday.  Will discern between possible more concerning disease pattern of tickborne illness versus herpes zoster.  Will cover both.  Patient states that she has no problem ceasing breast-feeding for the next month and that she has been supplementing with formula the majority the time over the past few weeks.  She is agreeable to return precautions as well as follow-up with her PCP in about a week.  Hemodynamically stable, no acute stress.       Medications   ibuprofen (MOTRIN) tablet 400 mg (400 mg Oral Given 12/23/24 2152)   ketorolac (TORADOL) injection 15 mg (15 mg Intravenous Given 12/23/24 2305)   doxycycline hyclate (VIBRAMYCIN) capsule 100 mg (100 mg Oral Given 12/24/24 0028)   oxyCODONE (ROXICODONE) IR tablet 5 mg (5 mg Oral Given 12/24/24 0052)   valACYclovir (VALTREX) tablet 1,000 mg (1,000 mg Oral Given 12/24/24 0052)       ED Risk Strat Scores                          SBIRT 22yo+      Flowsheet Row Most Recent Value   Initial Alcohol Screen: US AUDIT-C     1. How often do you have a drink containing alcohol? 1 Filed at: 12/23/2024 2157   2. How many drinks containing alcohol do you have on a typical day you are drinking?  1 Filed at: 12/23/2024 2157   3b.  FEMALE Any Age, or MALE 65+: How often do you have 4 or more drinks on one occassion? 1 Filed at: 12/23/2024 2157   Audit-C Score 3 Filed at: 12/23/2024 2157   MONY: How many times in the past year have you...    Used an illegal drug or used a prescription medication for non-medical reasons? Never Filed at: 12/23/2024 2157                            History of Present Illness       Chief Complaint   Patient presents with    Rash     Reports pruritic rash started on right hand 2 days ago, reports pain travels up to shoulder and down right leg, reports recent travel to [facility], returned home on Thursday. +headache, Last took Tylenol at 6pm and Benadryl around 7pm.        Past Medical History:   Diagnosis Date    Abnormal Pap smear of cervix     Disease of thyroid gland     HPV (human papilloma virus) infection       No past surgical history on file.   Family History   Problem Relation Age of Onset    Hyperlipidemia Mother     Diabetes Father     Kidney disease Father         on Hemodialysis    No Known Problems Sister     No Known Problems Brother     No Known Problems Daughter     No Known Problems Son     No Known Problems Maternal Grandmother     No Known Problems Maternal Grandfather     Diabetes Paternal Grandfather     Kidney disease Paternal Grandfather     No Known Problems Sister     No Known Problems Maternal Aunt     No Known Problems Maternal Aunt     No Known Problems Maternal Aunt     No Known Problems Paternal Aunt       Social History     Tobacco Use    Smoking status: Never    Smokeless tobacco: Never   Vaping Use    Vaping status: Never Used   Substance Use Topics    Alcohol use: Not Currently    Drug use: Never      E-Cigarette/Vaping    E-Cigarette Use Never User       E-Cigarette/Vaping Substances    Nicotine No     THC No     CBD No     Flavoring No     Other No     Unknown No       I have reviewed and agree with the history as documented.     Pt is a 38 y/o female presenting to the ED with 3  days of pain of RUE with pruritic/painful lesions erupting on hand yesterday and ascending centrifugally. Pt was seen at [facility] yesterday and rx acyclovir provided, underdosed if in fact herpes zoster. Recent travel over 20 days to [facility] visiting mother. Has had subjective fevers and chills as well. Mother dose have a flower garden, was exposed to sand, was outdoors. Uncertain of insect bites, did not notice any ticks.. No vaginal discharge/lesions. Has hx of varicella as a child.          Review of Systems   Constitutional:  Positive for chills, fatigue and fever.   Skin:  Positive for rash.   All other systems reviewed and are negative.          Objective       ED Triage Vitals   Temperature Pulse Blood Pressure Respirations SpO2 Patient Position - Orthostatic VS   12/23/24 2055 12/23/24 2055 12/23/24 2055 12/23/24 2055 12/23/24 2055 12/23/24 2130   97.8 °F (36.6 °C) 88 128/64 17 98 % Sitting      Temp Source Heart Rate Source BP Location FiO2 (%) Pain Score    12/23/24 2055 12/23/24 2055 12/23/24 2130 -- 12/23/24 2150    Oral Monitor Right arm  10 - Worst Possible Pain      Vitals      Date and Time Temp Pulse SpO2 Resp BP Pain Score FACES Pain Rating User   12/24/24 0052 -- -- -- -- -- 7 --    12/24/24 0030 -- 76 98 % -- 143/79 -- --    12/24/24 0029 -- -- -- -- -- 7 --    12/24/24 0000 -- 67 98 % -- 125/73 -- -- KB   12/23/24 2305 -- -- -- -- -- 10 - Worst Possible Pain --    12/23/24 2200 -- 65 100 % 18 125/64 -- -- DO   12/23/24 2152 -- -- -- -- -- 10 - Worst Possible Pain --    12/23/24 2150 -- -- -- -- -- 10 - Worst Possible Pain --    12/23/24 2130 -- 74 99 % 18 113/55 -- -- DO   12/23/24 2055 97.8 °F (36.6 °C) 88 98 % 17 128/64 -- --             Physical Exam  Vitals and nursing note reviewed.   Constitutional:       General: She is in acute distress.      Appearance: Normal appearance. She is not ill-appearing, toxic-appearing or diaphoretic.   HENT:      Head: Normocephalic and  atraumatic.      Nose: Nose normal. No congestion or rhinorrhea.      Mouth/Throat:      Mouth: Mucous membranes are moist.      Pharynx: Oropharynx is clear. No oropharyngeal exudate or posterior oropharyngeal erythema.   Eyes:      General: No scleral icterus.        Right eye: No discharge.         Left eye: No discharge.      Extraocular Movements: Extraocular movements intact.      Conjunctiva/sclera: Conjunctivae normal.      Pupils: Pupils are equal, round, and reactive to light.   Neck:      Vascular: No carotid bruit.   Cardiovascular:      Rate and Rhythm: Normal rate and regular rhythm.      Pulses: Normal pulses.      Heart sounds: Normal heart sounds. No murmur heard.     No friction rub. No gallop.   Pulmonary:      Effort: Pulmonary effort is normal. No respiratory distress.      Breath sounds: Normal breath sounds. No stridor. No wheezing, rhonchi or rales.   Chest:      Chest wall: No tenderness.   Abdominal:      General: Abdomen is flat. Bowel sounds are normal. There is no distension.      Palpations: Abdomen is soft.      Tenderness: There is no abdominal tenderness. There is no right CVA tenderness, left CVA tenderness, guarding or rebound.   Musculoskeletal:         General: No swelling, tenderness, deformity or signs of injury. Normal range of motion.      Cervical back: Normal range of motion and neck supple. No rigidity or tenderness.      Right lower leg: No edema.      Left lower leg: No edema.   Lymphadenopathy:      Cervical: No cervical adenopathy.   Skin:     General: Skin is warm and dry.      Coloration: Skin is not jaundiced or pale.      Findings: Erythema, lesion and rash present. No bruising.      Comments: Erythematous raised blisters of and around all 5 digits and beginning to erupt along volar aspect of RUE.   Neurological:      General: No focal deficit present.      Mental Status: She is alert and oriented to person, place, and time.      Cranial Nerves: No cranial nerve  deficit.      Sensory: No sensory deficit.      Motor: No weakness.      Coordination: Coordination normal.      Gait: Gait normal.   Psychiatric:         Behavior: Behavior normal.      Comments: anxious         Results Reviewed       Procedure Component Value Units Date/Time    Basic metabolic panel [[id number]] Collected: 12/23/24 2306    Lab Status: Final result Specimen: Blood from Arm, Left Updated: 12/23/24 2334     Sodium 138 mmol/L      Potassium 4.1 mmol/L      Chloride 103 mmol/L      CO2 28 mmol/L      ANION GAP 7 mmol/L      BUN 16 mg/dL      Creatinine 0.89 mg/dL      Glucose 87 mg/dL      Calcium 9.2 mg/dL      eGFR 82 ml/min/1.73sq m     Narrative:      National Kidney Disease Foundation guidelines for Chronic Kidney Disease (CKD):     Stage 1 with normal or high GFR (GFR > 90 mL/min/1.73 square meters)    Stage 2 Mild CKD (GFR = 60-89 mL/min/1.73 square meters)    Stage 3A Moderate CKD (GFR = 45-59 mL/min/1.73 square meters)    Stage 3B Moderate CKD (GFR = 30-44 mL/min/1.73 square meters)    Stage 4 Severe CKD (GFR = 15-29 mL/min/1.73 square meters)    Stage 5 End Stage CKD (GFR <15 mL/min/1.73 square meters)  Note: GFR calculation is accurate only with a steady state creatinine    CBC and differential [[id number]]  (Abnormal) Collected: 12/23/24 2306    Lab Status: Final result Specimen: Blood from Arm, Left Updated: 12/23/24 2320     WBC 6.24 Thousand/uL      RBC 4.82 Million/uL      Hemoglobin 14.4 g/dL      Hematocrit 43.4 %      MCV 90 fL      MCH 29.9 pg      MCHC 33.2 g/dL      RDW 12.6 %      MPV 12.2 fL      Platelets 195 Thousands/uL      nRBC 0 /100 WBCs      Segmented % 46 %      Immature Grans % 0 %      Lymphocytes % 37 %      Monocytes % 10 %      Eosinophils Relative 5 %      Basophils Relative 2 %      Absolute Neutrophils 2.89 Thousands/µL      Absolute Immature Grans 0.02 Thousand/uL      Absolute Lymphocytes 2.28 Thousands/µL      Absolute Monocytes 0.63 Thousand/µL       Eosinophils Absolute 0.32 Thousand/µL      Basophils Absolute 0.10 Thousands/µL             XR chest 1 view portable   ED Interpretation by Steve Ruff MD (12/23 0803)   NAD.      Final Interpretation by Soraida Lucas MD (12/24 7014)      No acute cardiopulmonary disease.            Workstation performed: [id number]             Procedures    ED Medication and Procedure Management   Prior to Admission Medications   Prescriptions Last Dose Informant Patient Reported? Taking?   aspirin-acetaminophen-caffeine (EXCEDRIN MIGRAINE) 250-250-65 MG per tablet   No No   Sig: Take 1 tablet by mouth every 6 (six) hours as needed for headaches   Patient not taking: Reported on 10/25/2022   famotidine (PEPCID) 20 mg tablet   No No   Sig: TAKE 1 TABLET BY MOUTH EVERY DAY   levothyroxine 50 mcg tablet   Yes No   Sig: Take 50 mcg by mouth every morning   ondansetron (ZOFRAN) 4 mg tablet   No No   Sig: Take 1 tablet (4 mg total) by mouth every 6 (six) hours      Facility-Administered Medications: None     Discharge Medication List as of 12/24/2024 12:35 AM        START taking these medications    Details   doxycycline hyclate (VIBRAMYCIN) 100 mg capsule Take 1 capsule (100 mg total) by mouth 2 (two) times a day for 14 days, Starting Tue 12/24/2024, Until Tue 1/7/2025, Normal      valACYclovir (VALTREX) 1,000 mg tablet Take 1 tablet (1,000 mg total) by mouth 3 (three) times a day for 7 days, Starting Tue 12/24/2024, Until Tue 12/31/2024, Normal           CONTINUE these medications which have NOT CHANGED    Details   aspirin-acetaminophen-caffeine (EXCEDRIN MIGRAINE) 250-250-65 MG per tablet Take 1 tablet by mouth every 6 (six) hours as needed for headaches, Starting Thu 8/25/2022, Normal      famotidine (PEPCID) 20 mg tablet TAKE 1 TABLET BY MOUTH EVERY DAY, Normal      levothyroxine 50 mcg tablet Take 50 mcg by mouth every morning, Starting Thu 8/18/2022, Historical Med      ondansetron (ZOFRAN) 4 mg tablet Take 1  tablet (4 mg total) by mouth every 6 (six) hours, Starting Mon 11/13/2023, Normal           No discharge procedures on file.  ED SEPSIS DOCUMENTATION   Time reflects when diagnosis was documented in both MDM as applicable and the Disposition within this note       Time User Action Codes Description Comment    12/24/2024 12:17 AM Maurice Clarke Add [B02.9] Herpes zoster     12/24/2024 12:17 AM Maurice Clarke Modify [B02.9] Herpes zoster possible    12/24/2024 12:22 AM Maurice Clarke Add [R21] Rash     12/24/2024 12:22 AM Maurice Clarke Add [Z91.89] At high risk for tick borne illness     12/24/2024 12:22 AM Maurice Clarke Modify [Z91.89] At high risk for tick borne illness possible                 Maurice Clarke DO  12/24/24 8741

## 2025-02-19 ENCOUNTER — OFFICE VISIT (OUTPATIENT)
Age: 40
End: 2025-02-19
Payer: COMMERCIAL

## 2025-02-19 DIAGNOSIS — H40.003 GLAUCOMA SUSPECT OF BOTH EYES: Primary | ICD-10-CM

## 2025-02-19 PROCEDURE — 92004 COMPRE OPH EXAM NEW PT 1/>: CPT | Performed by: OPHTHALMOLOGY

## 2025-02-19 PROCEDURE — 92133 CPTRZD OPH DX IMG PST SGM ON: CPT | Performed by: OPHTHALMOLOGY

## 2025-02-19 NOTE — PROGRESS NOTES
Name: Brendan Small      : 1985      MRN: [id number]  Encounter Provider: Aicha Childress MD  Encounter Date: 2025   Encounter department: [facility] OPHTHALMOLOGY  :  Assessment & Plan  Glaucoma suspect of both eyes    Orders:    OCT, Optic Nerve - OU - Both Eyes  Recommend no intervention; Pt has no evidence of glaucoma;           Brendan Small is a 40 y.o. female who presents today for glaucoma eval. Pt reports that she was told she had glaucoma 2 years ago. Denies any treatment. She reports intermittent blurry vision, irritation, pain OU. She denies family HX of glacuoma  History obtained from: patient    Review of Systems  Medical History Reviewed by provider this encounter:  Tobacco  Allergies  Meds  Problems  Med Hx  Surg Hx  Fam Hx     .     Past Ocular History:None  Ocular Meds/Drops: Tears PRN OU     Base Eye Exam       Visual Acuity (Snellen - Linear)         Right Left    Dist sc 20/25 20/20              Tonometry (Applanation, 10:20 AM)         Right Left    Pressure 11 11              Pachymetry (2025)         Right Left    Thickness [id number]              Gonioscopy         Right Left    Temporal CBB CBB    Nasal CBB CBB    Superior Open Open              Pupils         Dark APD    Right 3 -    Left 3 -              Visual Fields         Left Right     Full Full              Extraocular Movement         Right Left     Full, Ortho Full, Ortho              Neuro/Psych       Oriented x3: Yes              Dilation       Both eyes: 2.5% Phenylephrine @ 10:30 AM              Dilation #2       Both eyes: 1.0% Mydriacyl @ 10:30 AM                  Slit Lamp and Fundus Exam       External Exam         Right Left    External Normal Normal              Slit Lamp Exam         Right Left    Lids/Lashes wnl wnl    Conjunctiva/Sclera White and quiet White and quiet    Cornea Clear Clear    Anterior Chamber Deep and quiet Deep and quiet    Iris Round and reactive Round and reactive     Lens clear clear    Anterior Vitreous no PVD, clear, no cell no PVD, clear, no cell              Fundus Exam         Right Left    Disc sharp, no pallor sharp, no pallor    C/D Ratio 0.3 0.35    Macula Good foveal reflex Good foveal reflex    Vessels normal in caliber normal in caliber    Periphery Flat, no retinal tear or detachment, no masses Flat, no retinal tear or detachment, no masses                      IMAGING:  OCT, Optic Nerve - OU - Both Eyes          Right Eye  Reliability was good. Temporal thickness was normal. Superior thickness was normal. Nasal thickness was normal. Inferior thickness was normal.     Left Eye  Reliability was good. Temporal thickness was normal. Superior thickness was normal. Nasal thickness was normal. Inferior thickness was normal.